# Patient Record
Sex: FEMALE | Race: BLACK OR AFRICAN AMERICAN | NOT HISPANIC OR LATINO | Employment: FULL TIME | URBAN - METROPOLITAN AREA
[De-identification: names, ages, dates, MRNs, and addresses within clinical notes are randomized per-mention and may not be internally consistent; named-entity substitution may affect disease eponyms.]

---

## 2017-05-10 ENCOUNTER — HOSPITAL ENCOUNTER (EMERGENCY)
Facility: HOSPITAL | Age: 42
Discharge: HOME | End: 2017-05-10
Attending: EMERGENCY MEDICINE
Payer: MEDICAID

## 2017-05-10 VITALS
SYSTOLIC BLOOD PRESSURE: 139 MMHG | HEIGHT: 66 IN | RESPIRATION RATE: 17 BRPM | DIASTOLIC BLOOD PRESSURE: 74 MMHG | WEIGHT: 155 LBS | HEART RATE: 61 BPM | BODY MASS INDEX: 24.91 KG/M2 | OXYGEN SATURATION: 99 % | TEMPERATURE: 97 F

## 2017-05-10 DIAGNOSIS — R51.9 SINUS HEADACHE: Primary | ICD-10-CM

## 2017-05-10 LAB
B-HCG UR QL: NEGATIVE
CTP QC/QA: YES

## 2017-05-10 PROCEDURE — 99284 EMERGENCY DEPT VISIT MOD MDM: CPT | Mod: 25

## 2017-05-10 PROCEDURE — 63600175 PHARM REV CODE 636 W HCPCS: Performed by: NURSE PRACTITIONER

## 2017-05-10 PROCEDURE — 25000003 PHARM REV CODE 250: Performed by: NURSE PRACTITIONER

## 2017-05-10 PROCEDURE — 96372 THER/PROPH/DIAG INJ SC/IM: CPT

## 2017-05-10 PROCEDURE — 81025 URINE PREGNANCY TEST: CPT | Performed by: NURSE PRACTITIONER

## 2017-05-10 RX ORDER — LORATADINE 10 MG/1
10 TABLET ORAL DAILY
Qty: 30 TABLET | Refills: 0 | Status: SHIPPED | OUTPATIENT
Start: 2017-05-10 | End: 2018-10-12

## 2017-05-10 RX ORDER — BUTALBITAL, ACETAMINOPHEN AND CAFFEINE 50; 325; 40 MG/1; MG/1; MG/1
1 TABLET ORAL EVERY 4 HOURS PRN
Qty: 20 TABLET | Refills: 0 | Status: SHIPPED | OUTPATIENT
Start: 2017-05-10 | End: 2017-06-09

## 2017-05-10 RX ORDER — NAPROXEN 500 MG/1
500 TABLET ORAL 2 TIMES DAILY PRN
Qty: 30 TABLET | Refills: 0 | Status: SHIPPED | OUTPATIENT
Start: 2017-05-10 | End: 2018-09-05

## 2017-05-10 RX ORDER — NAPROXEN SODIUM 220 MG
220 TABLET ORAL
COMMUNITY
End: 2018-09-05

## 2017-05-10 RX ORDER — KETOROLAC TROMETHAMINE 30 MG/ML
30 INJECTION, SOLUTION INTRAMUSCULAR; INTRAVENOUS
Status: COMPLETED | OUTPATIENT
Start: 2017-05-10 | End: 2017-05-10

## 2017-05-10 RX ORDER — ACETAMINOPHEN 500 MG
500 TABLET ORAL EVERY 6 HOURS PRN
COMMUNITY
End: 2017-05-10 | Stop reason: ALTCHOICE

## 2017-05-10 RX ORDER — FLUTICASONE PROPIONATE 50 MCG
1 SPRAY, SUSPENSION (ML) NASAL 2 TIMES DAILY PRN
Qty: 1 BOTTLE | Refills: 0 | Status: SHIPPED | OUTPATIENT
Start: 2017-05-10 | End: 2018-09-05

## 2017-05-10 RX ORDER — OXYMETAZOLINE HCL 0.05 %
2 SPRAY, NON-AEROSOL (ML) NASAL
Status: COMPLETED | OUTPATIENT
Start: 2017-05-10 | End: 2017-05-10

## 2017-05-10 RX ADMIN — OXYMETAZOLINE HYDROCHLORIDE 2 SPRAY: 5 SPRAY NASAL at 05:05

## 2017-05-10 RX ADMIN — KETOROLAC TROMETHAMINE 30 MG: 30 INJECTION, SOLUTION INTRAMUSCULAR at 07:05

## 2017-05-10 NOTE — ED PROVIDER NOTES
Encounter Date: 5/10/2017    SCRIBE #1 NOTE: I, Tracy Bell, am scribing for, and in the presence of,  Raj Ch NP. I have scribed the following portions of the note - Other sections scribed: ROS, HPI.       History     Chief Complaint   Patient presents with    Migraine     Pt. c/o migraine headaches intermittent since yesterday accompanied by pressure behind right eye. Pt. reports taking Excedrin without any relief.      Review of patient's allergies indicates:  No Known Allergies  HPI Comments: CC: Headache    HPI: Patient is a 41 y.o. F with a past medical history of Allergy; Headache; Sickle cell trait; and Thyroid disease who presents to the ED for evaluation of an acute R-sided headache and pain behind her eyes x1 week with associated photophobia, blurred vision due to pain, dizziness, and L sided neck pain. She adds that she became congested and started coughing and sneezing around the same time that her headaches began. Pain is severe. She attempted treatment with Excedrin with no relief. Patient denies sore throat, fever, and/or difficulty walking. She has never been formally diagnosed with migraines. No prior similar episodes reported.      The history is provided by the patient. No  was used.     Past Medical History:   Diagnosis Date    Allergy     seasonal    Headache     Sickle cell trait     Thyroid disease      Past Surgical History:   Procedure Laterality Date    COLONOSCOPY      TUBAL LIGATION       Family History   Problem Relation Age of Onset    Hypertension Mother     Hyperlipidemia Mother     Heart disease Father     Kidney disease Father     Heart disease Sister     Breast cancer Maternal Aunt     Hypertension Maternal Aunt     Clotting disorder Maternal Aunt     Anxiety disorder Maternal Aunt     Heart attack Maternal Aunt     Diabetes Sister     Hyperlipidemia Sister     Hypertension Sister      Social History   Substance Use Topics    Smoking  status: Current Every Day Smoker    Smokeless tobacco: None    Alcohol use No     Review of Systems   Constitutional: Negative for fever.   HENT: Positive for congestion, postnasal drip, rhinorrhea, sinus pressure and sneezing.    Eyes: Positive for photophobia and visual disturbance (blurred vision).   Respiratory: Positive for cough.    Cardiovascular: Negative for chest pain.   Gastrointestinal: Negative for abdominal pain, diarrhea, nausea and vomiting.   Genitourinary: Negative for dysuria.   Musculoskeletal: Positive for neck pain (L sided).   Skin: Negative for rash.   Neurological: Positive for dizziness and headaches (R sided with pain behind bilateral ey es).       Physical Exam   Initial Vitals   BP Pulse Resp Temp SpO2   05/10/17 1510 05/10/17 1510 05/10/17 1510 05/10/17 1510 05/10/17 1510   143/77 76 17 98.8 °F (37.1 °C) 99 %     Physical Exam    Nursing note and vitals reviewed.  Constitutional: She appears well-developed and well-nourished. She is not diaphoretic. No distress.   HENT:   Head: Normocephalic and atraumatic.   Right Ear: External ear normal.   Left Ear: External ear normal.   Nose: Nose normal.   Eyes: Conjunctivae and EOM are normal. Pupils are equal, round, and reactive to light. Right eye exhibits no discharge. Left eye exhibits no discharge. No scleral icterus.   Neck: Normal range of motion. Neck supple. No thyromegaly present. No tracheal deviation present. No JVD present.   Cardiovascular: Normal rate, regular rhythm, S1 normal, S2 normal, normal heart sounds and intact distal pulses. Exam reveals no gallop and no friction rub.    No murmur heard.  Palpable right temporal artery is without tenderness or crepitus   Pulmonary/Chest: Breath sounds normal. No stridor. No respiratory distress. She has no wheezes. She has no rhonchi. She has no rales.   Abdominal: Soft. Bowel sounds are normal. She exhibits no distension and no mass. There is no tenderness. There is no rebound and no  guarding.   Musculoskeletal: Normal range of motion. She exhibits no edema or tenderness.   Lymphadenopathy:     She has no cervical adenopathy.   Neurological: She is alert and oriented to person, place, and time. She has normal strength and normal reflexes. She displays normal reflexes. No cranial nerve deficit or sensory deficit.   Skin: Skin is warm and dry. No rash and no abscess noted. No erythema. No pallor.   Psychiatric: She has a normal mood and affect. Her behavior is normal. Judgment and thought content normal.         ED Course   Procedures  Labs Reviewed   POCT URINE PREGNANCY             Medical Decision Making:   Clinical Tests:   Radiological Study: Ordered and Reviewed  ED Management:  This is a 41 year old female presenting with right-sided temporal and frontal headache that began 2 weeks ago and has been intermittent. Pt describes headache as a pressure behind her right eye and in her right temporal area. Pt also presents with sinus congestion and rhinorrhea. Pt denies blurred vision and dizziness at this time. On exam there are no focal neuro deficits. PERRLA bilaterally. No meningismus. CT head shows no evidence of any acute intracranial process. Based on these findings I suspect sinus pressure and headache. I considered but doubt temporal arteritis, intracranial hemorrhage, tension headache, meningitis. Administered Afrin nasal spray in the ED. Prescribed Flonase, loratadine, Naproxen, and Fioricet at discharge. Pt discharged home with instructions for follow up and supportive care. ED return precautions given. Pt verbalized understanding of all information and instructions given. This case was discussed with Eyal Cerrato MD who agreed with the assessment and plan.      Other:   I have discussed this case with another health care provider.            Scribe Attestation:   Scribe #1: I performed the above scribed service and the documentation accurately describes the services I performed. I  attest to the accuracy of the note.    Attending Attestation:           Physician Attestation for Scribe:  Physician Attestation Statement for Scribe #1: I, Raj Ch NP, reviewed documentation, as scribed by Tracy Bell in my presence, and it is both accurate and complete.                 ED Course     Clinical Impression:   The encounter diagnosis was Sinus headache.    Disposition:   Disposition: Discharged  Condition: Stable       Raj Ch NP  05/10/17 1956

## 2017-05-10 NOTE — ED AVS SNAPSHOT
[facility]  [place] [address]               Melany Hackett   5/10/2017  5:05 PM   ED    Description:  Female : 1975   Department:  [facility]           Your Care was Coordinated By:     Provider Role From To    Eyal Cerrato MD Attending Provider 05/10/17 1706 --    Raj Ch NP Nurse Practitioner 05/10/17 1706 --      Reason for Visit     Migraine           Diagnoses this Visit        Comments    Sinus headache    -  Primary       ED Disposition     None           To Do List           Follow-up Information     Follow up with [facility] MD. Schedule an appointment as soon as possible for a visit in 3 days.    Specialty:  Family Medicine    Why:  For further evaluation    Contact information:    [place] [facility]  [phone]          Go to [facility].    Specialty:  Emergency Medicine    Why:  If symptoms worsen, As needed    Contact information:    [place] [id number] [phone]       These Medications        Disp Refills Start End    loratadine (CLARITIN) 10 mg tablet 30 tablet 0 5/10/2017 5/10/2018    Take 1 tablet (10 mg total) by mouth once daily. - Oral    Pharmacy: [id number] - [place], [place] - [address] Ph #: [phone]       fluticasone (FLONASE) 50 mcg/actuation nasal spray 1 Bottle 0 5/10/2017     1 spray by Each Nare route 2 (two) times daily as needed for Rhinitis or Allergies. - Each Nare    Pharmacy: [facility] [address] [place], [place] [address] [address] Ph #: [phone]       naproxen (NAPROSYN) 500 MG tablet 30 tablet 0 5/10/2017     Take 1 tablet (500 mg total) by mouth 2 (two) times daily as needed (for pain). - Oral    Pharmacy: [id number] - [place], [place] - [address] Ph #: [phone]       butalbital-acetaminophen-caffeine -40 mg (FIORICET, ESGIC) -40 mg per tablet 20 tablet 0  5/10/2017 2017    Take 1 tablet by mouth every 4 (four) hours as needed for Pain. - Oral    [facility] [address] [place] [address] [address]  #: [phone]         [facility]     [facility] Nurse Care Line -  Assistance  Unless otherwise directed by your provider, please contact [facility], our nurse care line that is available for  assistance.     Registered nurses in the [facility] provide: appointment scheduling, clinical advisement, health education, and other advisory services.  Call: [phone] (toll free)               Medications           START taking these NEW medications        Refills    loratadine (CLARITIN) 10 mg tablet 0    Sig: Take 1 tablet (10 mg total) by mouth once daily.    Class: Print    Route: Oral    fluticasone (FLONASE) 50 mcg/actuation nasal spray 0    Si spray by Each Nare route 2 (two) times daily as needed for Rhinitis or Allergies.    Class: Print    Route: Each Nare    naproxen (NAPROSYN) 500 MG tablet 0    Sig: Take 1 tablet (500 mg total) by mouth 2 (two) times daily as needed (for pain).    Class: Print    Route: Oral    butalbital-acetaminophen-caffeine -40 mg (FIORICET, ESGIC) -40 mg per tablet 0    Sig: Take 1 tablet by mouth every 4 (four) hours as needed for Pain.    Class: Print    Route: Oral      These medications were administered today        Dose Freq    oxymetazoline 0.05 % nasal spray 2 spray 2 spray ED 1 Time    Si sprays by Each Nare route ED 1 Time.    Class: Normal    Route: Each Nare    ketorolac injection 30 mg 30 mg ED 1 Time    Sig: Inject 30 mg into the muscle ED 1 Time.    Class: Normal    Route: Intramuscular      STOP taking these medications     meloxicam (MOBIC) 7.5 MG tablet     aspirin-acetaminophen-caffeine 250-250-65 mg (EXCEDRIN MIGRAINE) 250-250-65 mg per tablet Take 1 tablet by mouth every 6 (six) hours as needed for Pain.    acetaminophen (TYLENOL) 500 MG tablet  "Take 500 mg by mouth every 6 (six) hours as needed for Pain.           Verify that the below list of medications is an accurate representation of the medications you are currently taking.  If none reported, the list may be blank. If incorrect, please contact your healthcare provider. Carry this list with you in case of emergency.           Current Medications     naproxen sodium (ANAPROX) 220 MG tablet Take 220 mg by mouth every 12 (twelve) hours.    butalbital-acetaminophen-caffeine -40 mg (FIORICET, ESGIC) -40 mg per tablet Take 1 tablet by mouth every 4 (four) hours as needed for Pain.    ergocalciferol (ERGOCALCIFEROL) 50,000 unit Cap Take 1 capsule (50,000 Units total) by mouth every 7 days.    fluticasone (FLONASE) 50 mcg/actuation nasal spray 1 spray by Each Nare route 2 (two) times daily as needed for Rhinitis or Allergies.    ketorolac injection 30 mg Inject 30 mg into the muscle ED 1 Time.    loratadine (CLARITIN) 10 mg tablet Take 1 tablet (10 mg total) by mouth once daily.    naproxen (NAPROSYN) 500 MG tablet Take 1 tablet (500 mg total) by mouth 2 (two) times daily as needed (for pain).           Clinical Reference Information           Your Vitals Were     BP Pulse Temp Resp Height Weight    139/74 (BP Location: Left arm, Patient Position: Sitting, BP Method: Automatic) 61 97.2 °F (36.2 °C) (Oral) 17 5' 6" (1.676 m) 70.3 kg (155 lb)    SpO2 BMI             99% 25.02 kg/m2         Allergies as of 5/10/2017     No Known Allergies      Immunizations Administered on Date of Encounter - 5/10/2017     None      ED Micro, Lab, POCT     Start Ordered       Status Ordering Provider    05/10/17 1725 05/10/17 1724  POCT urine pregnancy  Once      Final result       ED Imaging Orders     Start Ordered       Status Ordering Provider    05/10/17 1724 05/10/17 1724  CT Head Without Contrast  1 time imaging      Final result         Discharge Instructions       Follow up with your primary care physician " for further evaluation.    Take all medications as prescribed.    Return to the emergency department for any new or worsening symptoms.    Discharge References/Attachments     HEADACHES, SINUS (ENGLISH)    SINUS HEADACHE (ENGLISH)      Smoking Cessation     If you would like to quit smoking:   You may be eligible for free services if you are a [place] resident and started smoking cigarettes before September 1, 1988.  Call the [employer]oking [employer]essation [employer] ([employer]) toll free at (149) 705-0386 or (022) 803-4013.   Call 1-800-QUIT-NOW if you do not meet the above criteria.   Contact us via email: [email or web address]   View our website for more information: [email or web address]         [facility] complies with applicable Federal civil rights laws and does not discriminate on the basis of race, color, national origin, age, disability, or sex.        Language Assistance Services     ATTENTION: Language assistance services are available, free of charge. Please call [phone].      ATENCIÓN: Si habla español, tiene a floyd disposición servicios gratuitos de asistencia lingüística. Llame al [phone].     CHÚ Ý: N?u b?n nói Ti?ng Vi?t, có các d?ch v? h? tr? ngôn ng? mi?n phí baileeh cho b?n. G?i s? [phone].

## 2017-05-10 NOTE — ED TRIAGE NOTES
Headache for about a week now by right eye has had frontal headache blurred vision and upset stomach

## 2017-05-11 NOTE — DISCHARGE INSTRUCTIONS
Follow up with your primary care physician for further evaluation.    Take all medications as prescribed.    Return to the emergency department for any new or worsening symptoms.

## 2018-05-08 ENCOUNTER — HOSPITAL ENCOUNTER (EMERGENCY)
Facility: HOSPITAL | Age: 43
Discharge: HOME | End: 2018-05-09
Attending: EMERGENCY MEDICINE
Payer: MEDICAID

## 2018-05-08 DIAGNOSIS — T25.222A PARTIAL THICKNESS BURN OF LEFT FOOT, INITIAL ENCOUNTER: Primary | ICD-10-CM

## 2018-05-08 PROCEDURE — 99283 EMERGENCY DEPT VISIT LOW MDM: CPT | Mod: 25

## 2018-05-08 PROCEDURE — 16020 DRESS/DEBRID P-THICK BURN S: CPT

## 2018-05-08 PROCEDURE — 81025 URINE PREGNANCY TEST: CPT | Performed by: EMERGENCY MEDICINE

## 2018-05-09 VITALS
SYSTOLIC BLOOD PRESSURE: 118 MMHG | WEIGHT: 147 LBS | RESPIRATION RATE: 18 BRPM | OXYGEN SATURATION: 99 % | BODY MASS INDEX: 24.49 KG/M2 | TEMPERATURE: 98 F | HEIGHT: 65 IN | HEART RATE: 63 BPM | DIASTOLIC BLOOD PRESSURE: 71 MMHG

## 2018-05-09 LAB
B-HCG UR QL: NEGATIVE
CTP QC/QA: YES

## 2018-05-09 PROCEDURE — 25000003 PHARM REV CODE 250: Performed by: NURSE PRACTITIONER

## 2018-05-09 PROCEDURE — 90715 TDAP VACCINE 7 YRS/> IM: CPT | Performed by: NURSE PRACTITIONER

## 2018-05-09 PROCEDURE — 90471 IMMUNIZATION ADMIN: CPT | Performed by: NURSE PRACTITIONER

## 2018-05-09 PROCEDURE — 63600175 PHARM REV CODE 636 W HCPCS: Performed by: NURSE PRACTITIONER

## 2018-05-09 RX ORDER — HYDROCODONE BITARTRATE AND ACETAMINOPHEN 5; 325 MG/1; MG/1
1 TABLET ORAL EVERY 4 HOURS PRN
Qty: 5 TABLET | Refills: 0 | Status: SHIPPED | OUTPATIENT
Start: 2018-05-09 | End: 2018-09-05

## 2018-05-09 RX ORDER — SILVER SULFADIAZINE 10 G/1000G
1 CREAM TOPICAL
Status: COMPLETED | OUTPATIENT
Start: 2018-05-09 | End: 2018-05-09

## 2018-05-09 RX ORDER — HYDROCODONE BITARTRATE AND ACETAMINOPHEN 5; 325 MG/1; MG/1
1 TABLET ORAL
Status: COMPLETED | OUTPATIENT
Start: 2018-05-09 | End: 2018-05-09

## 2018-05-09 RX ORDER — SILVER SULFADIAZINE 10 G/1000G
CREAM TOPICAL 2 TIMES DAILY
Qty: 30 G | Refills: 0 | Status: SHIPPED | OUTPATIENT
Start: 2018-05-09 | End: 2018-09-05

## 2018-05-09 RX ORDER — IBUPROFEN 400 MG/1
400 TABLET ORAL EVERY 6 HOURS PRN
Qty: 20 TABLET | Refills: 0 | Status: SHIPPED | OUTPATIENT
Start: 2018-05-09 | End: 2018-09-05

## 2018-05-09 RX ADMIN — CLOSTRIDIUM TETANI TOXOID ANTIGEN (FORMALDEHYDE INACTIVATED), CORYNEBACTERIUM DIPHTHERIAE TOXOID ANTIGEN (FORMALDEHYDE INACTIVATED), BORDETELLA PERTUSSIS TOXOID ANTIGEN (GLUTARALDEHYDE INACTIVATED), BORDETELLA PERTUSSIS FILAMENTOUS HEMAGGLUTININ ANTIGEN (FORMALDEHYDE INACTIVATED), BORDETELLA PERTUSSIS PERTACTIN ANTIGEN, AND BORDETELLA PERTUSSIS FIMBRIAE 2/3 ANTIGEN 0.5 ML: 5; 2; 2.5; 5; 3; 5 INJECTION, SUSPENSION INTRAMUSCULAR at 12:05

## 2018-05-09 RX ADMIN — SILVER SULFADIAZINE 1 TUBE: 10 CREAM TOPICAL at 12:05

## 2018-05-09 RX ADMIN — HYDROCODONE BITARTRATE AND ACETAMINOPHEN 1 TABLET: 5; 325 TABLET ORAL at 12:05

## 2018-05-09 NOTE — ED PROVIDER NOTES
Encounter Date: 5/8/2018    This is a 42 y.o. female complaining of real to left dorsal foot. Pt spilled boiling water just PTA. Treated at home with Slivadene. Pertinent exam findings remarkable for erythema and tenderness to left dorsal foot. Orders entered if indicated.    Raj Ch NP    SCRIBE #1 NOTE: Jose Carlos COOK am scribing for, and in the presence of,  Princess Duran NP. I have scribed the following portions of the note - Other sections scribed: HPI, ROS.       History     Chief Complaint   Patient presents with    Burn     Pt reports spilling boiling water on L foot.  Briggs noted to top of L foot.      CC: Burn    42 year old female  has a past medical history of Allergy; Headache; Hyperlipemia; Sickle cell trait; and Thyroid disease presents to the ED for evaluation of a burn to the dorsal aspect of her left foot. Pt reports spilling boiling water on left foot. She removed her sock and applied SSD cream. Pt reports the pain has been constant since the incident. Pt's last tetanus shot was in 2012. She denies fever, chills, difficulty walking, and other associated symptoms. No prior attempts at treatment reported.       The history is provided by the patient. No  was used.     Review of patient's allergies indicates:  No Known Allergies  Past Medical History:   Diagnosis Date    Allergy     seasonal    Headache     Hyperlipemia     Sickle cell trait     Thyroid disease      Past Surgical History:   Procedure Laterality Date    COLONOSCOPY      TUBAL LIGATION       Family History   Problem Relation Age of Onset    Hypertension Mother     Hyperlipidemia Mother     Heart disease Father     Kidney disease Father     Heart disease Sister     Breast cancer Maternal Aunt     Hypertension Maternal Aunt     Clotting disorder Maternal Aunt     Anxiety disorder Maternal Aunt     Heart attack Maternal Aunt     Diabetes Sister     Hyperlipidemia Sister      Hypertension Sister      Social History   Substance Use Topics    Smoking status: Current Every Day Smoker    Smokeless tobacco: Current User    Alcohol use Yes     Review of Systems   Constitutional: Negative for fever.   HENT: Negative for sore throat.    Respiratory: Negative for shortness of breath.    Cardiovascular: Negative for chest pain.   Gastrointestinal: Negative for nausea.   Genitourinary: Negative for dysuria.   Musculoskeletal: Negative for back pain.   Skin: Positive for wound (to top of L foot). Negative for rash.   Neurological: Negative for weakness.   Hematological: Does not bruise/bleed easily.       Physical Exam     Initial Vitals [05/08/18 2131]   BP Pulse Resp Temp SpO2   126/87 93 18 98.8 °F (37.1 °C) 97 %      MAP       100         Physical Exam    Constitutional: She appears well-developed and well-nourished. She is not diaphoretic. No distress.   HENT:   Head: Normocephalic and atraumatic.   Neck: Normal range of motion.   Cardiovascular: Normal rate, regular rhythm and normal heart sounds. Exam reveals no gallop and no friction rub.    No murmur heard.  Pulmonary/Chest: Breath sounds normal. No respiratory distress.   Musculoskeletal: Normal range of motion.        Left ankle: Normal.        Left foot: There is tenderness and deformity (4cm in diameter circular area of erythema noted to the top of the left foot with blistering.  No drainage or discharge noted. ). There is normal range of motion and normal capillary refill.   Neurological: She is alert and oriented to person, place, and time.   Skin: Skin is warm and dry.   Psychiatric: She has a normal mood and affect. Her behavior is normal.         ED Course   Procedures  Labs Reviewed   POCT URINE PREGNANCY             Medical Decision Making:   ED Management:  This is an evaluation of a 42 y.o. female that presents to the Emergency Department for burn to top of left foot.  Physical Exam shows a non-toxic, afebrile, and well  appearing female. There is a 4 cm x 4 cm area of erythema with blistering to the dorsal aspect of the left foot. No surrounding induration and no surrounding erythema.  There is not active drainage. The patient has full range of motion of the ankle and toes.  Foot and ankle compartments are soft.  Good capillary refill.  Good dorsalis pedis pulse.  Patient is able to ambulate without any difficulty.    Vital Signs Are Reassuring.   UPT negative.    My overall impression is superficial partial-thickness burn. I considered, but at this time, do not suspect compartment syndrome, cellulitis, or neurovascular compromise.    ED Course:  Tdap, Norco, Silvadene. D/C Meds:  Norco, ibuprofen, Silvadene. Additional D/C Information:  Burn care and wound care.  The diagnosis, treatment plan, instructions for follow-up and reevaluation with her PCP or the ED in 2 - 3 days for wound recheck as well as ED return precautions were discussed and understanding was verbalized. All questions or concerns have been addressed.     This case was discussed with Dr. Stovall who is in agreement with my assessment and plan.             Scribe Attestation:   Scribe #1: I performed the above scribed service and the documentation accurately describes the services I performed. I attest to the accuracy of the note.    Attending Attestation:           Physician Attestation for Scribe:  Physician Attestation Statement for Scribe #1: I, Princess Duran NP, reviewed documentation, as scribed by Jose Carlos Mo in my presence, and it is both accurate and complete.                    Clinical Impression:   The encounter diagnosis was Partial thickness burn of left foot, initial encounter.    Disposition:   Disposition: Discharged  Condition: Stable                        Princess Duran NP  05/09/18 0535

## 2018-05-09 NOTE — ED TRIAGE NOTES
Patient presents to the ER with boyfriend via personal vehicle. Patient reports that she spilled some boiling water on her left foot. Reports she had to peel off her sock. 9/10 pain

## 2018-05-09 NOTE — DISCHARGE INSTRUCTIONS
You have been prescribed NORCO for pain. Please do not take this medication while working, drinking alcohol, swimming, or while driving/operating heavy machinery. This medication may cause drowsiness, impair judgment, and reduce physical capabilities.    You have been prescribed ibuprofen for pain. This is an Non-Steroidal Anti-Inflammatory (NSAID) Medication. Please do not take any additional NSAIDs while you are taking this medication including (Advil, Aleve, Motrin, Ibuprofen, Mobic\meloxicam, Naprosyn, etc.). Please stop taking this medication if you experience: weakness, itching, yellow skin or eyes, joint pains, vomiting blood, blood or black stools, unusual weight gain, or swelling in your arms, legs, hands, or feet.       Please keep your wound clean and dry.  Wash gently with soap and water and apply burn ointment over the wound after washing. Please watch for signs of infection including: increased\spreading redness, swelling, pus-like discharge, or a fever greater than 100.4F. If you experience any of these, please contact your Primary Care Doctor or Return to the Emergency Department for a wound check.     Please follow up with your Primary Care Doctor in 2 days for wound recheck.  You may return to the Emergency Department if you are unable to see your Primary Care Doctor.  Please return to the ER for any new or worsening symptoms.

## 2018-09-05 PROBLEM — K80.20 GALLSTONES: Status: ACTIVE | Noted: 2018-09-05

## 2018-09-27 ENCOUNTER — TELEPHONE (OUTPATIENT)
Dept: SURGERY | Facility: CLINIC | Age: 43
End: 2018-09-27

## 2018-10-12 ENCOUNTER — HOSPITAL ENCOUNTER (OUTPATIENT)
Dept: PREADMISSION TESTING | Facility: HOSPITAL | Age: 43
Discharge: HOME | End: 2018-10-12
Attending: SURGERY
Payer: COMMERCIAL

## 2018-10-12 VITALS
HEART RATE: 85 BPM | OXYGEN SATURATION: 99 % | HEIGHT: 66 IN | SYSTOLIC BLOOD PRESSURE: 114 MMHG | TEMPERATURE: 98 F | DIASTOLIC BLOOD PRESSURE: 70 MMHG | BODY MASS INDEX: 25.55 KG/M2 | RESPIRATION RATE: 18 BRPM | WEIGHT: 159 LBS

## 2018-10-12 DIAGNOSIS — K80.20 GALLSTONES: ICD-10-CM

## 2018-10-12 LAB
ALBUMIN SERPL BCP-MCNC: 2.9 G/DL
ALP SERPL-CCNC: 814 U/L
ALT SERPL W/O P-5'-P-CCNC: 55 U/L
ANION GAP SERPL CALC-SCNC: 9 MMOL/L
AST SERPL-CCNC: 50 U/L
BASOPHILS # BLD AUTO: 0.06 K/UL
BASOPHILS NFR BLD: 1.1 %
BILIRUB SERPL-MCNC: 0.9 MG/DL
BUN SERPL-MCNC: 13 MG/DL
CALCIUM SERPL-MCNC: 9.8 MG/DL
CHLORIDE SERPL-SCNC: 104 MMOL/L
CO2 SERPL-SCNC: 24 MMOL/L
CREAT SERPL-MCNC: 1 MG/DL
DIFFERENTIAL METHOD: ABNORMAL
EOSINOPHIL # BLD AUTO: 0.2 K/UL
EOSINOPHIL NFR BLD: 4.3 %
ERYTHROCYTE [DISTWIDTH] IN BLOOD BY AUTOMATED COUNT: 16.9 %
EST. GFR  (AFRICAN AMERICAN): >60 ML/MIN/1.73 M^2
EST. GFR  (NON AFRICAN AMERICAN): >60 ML/MIN/1.73 M^2
GLUCOSE SERPL-MCNC: 86 MG/DL
HCT VFR BLD AUTO: 38 %
HGB BLD-MCNC: 12.5 G/DL
LYMPHOCYTES # BLD AUTO: 1.4 K/UL
LYMPHOCYTES NFR BLD: 26.4 %
MCH RBC QN AUTO: 24.3 PG
MCHC RBC AUTO-ENTMCNC: 32.9 G/DL
MCV RBC AUTO: 74 FL
MONOCYTES # BLD AUTO: 0.9 K/UL
MONOCYTES NFR BLD: 16.2 %
NEUTROPHILS # BLD AUTO: 2.8 K/UL
NEUTROPHILS NFR BLD: 51.8 %
PLATELET # BLD AUTO: 384 K/UL
PMV BLD AUTO: 9.6 FL
POTASSIUM SERPL-SCNC: 4.1 MMOL/L
PROT SERPL-MCNC: 8.9 G/DL
RBC # BLD AUTO: 5.14 M/UL
SODIUM SERPL-SCNC: 137 MMOL/L
WBC # BLD AUTO: 5.37 K/UL

## 2018-10-12 PROCEDURE — 36415 COLL VENOUS BLD VENIPUNCTURE: CPT

## 2018-10-12 PROCEDURE — 80053 COMPREHEN METABOLIC PANEL: CPT

## 2018-10-12 PROCEDURE — 85025 COMPLETE CBC W/AUTO DIFF WBC: CPT

## 2018-10-12 NOTE — DISCHARGE INSTRUCTIONS
Your surgery is scheduled for____10/19/2018_____________.    Call [phone] between 2 pm and 5 pm ____10/18/2018_______ to find out your arrival time for the day of surgery.    Report to SAME DAY SURGERY UNIT at _______am on the 2nd floor of the hospital.  Use the front entrance of the hospital.  The front doors of the hospital open promptly at 5:30 am.    If you need wheelchair assistance, call [phone] from your cell phone,  or call 0 from the courtesy phone in the hospital lobby.    Important instructions:   Do not eat or drink after 12 midnight, including water.  It is okay to brush your teeth.  Do not have gum, candy or mints.      Stop taking Aspirin, Ibuprofen, Motrin and Aleve , Fish oil, and Vitamin E for at least 7 days before your surgery. You may use Tylenol unless otherwise instructed by your doctor.         Please shower the night before and the morning of your surgery.        Use Hibiclens soap to your surgery site if instructed by your pre op nurse.   If your surgery is on your abdomen, be sure to wash your naval.  Be sure to rinse off Hibiclens after it is on your skin for several minutes.  Do not use Hibiclens on your face or genitals. Please place clean linens on your bed the night before surgery. Please wear fresh clean clothing after each shower.     No shaving of procedural area at least 4-5 days before surgery due to increased risk of skin irritation and/or possible infection.     Do not wear make- up, including mascara.     You may wear deodorant only.      Do not wear powder, body lotion or cologne.     Do not wear any jewelry or have any metal on your body.     Please bring any documents given to you by your doctor.     If you are going home on the same day of surgery, you must arrange for a family member or a friend to drive you home.  Public transportation is prohibited.    You will not be able to drive home if you were given anesthesia or sedation.     Wear loose fitting  clothes allowing for bandages.     Please leave money and valuables home.       You may bring your cell phone.     Call the doctor if fever or illness should occur before your surgery.    Call [phone] to contact us here at [facility] if needed.

## 2018-10-19 ENCOUNTER — HOSPITAL ENCOUNTER (OUTPATIENT)
Facility: HOSPITAL | Age: 43
Discharge: HOME | End: 2018-10-19
Attending: SURGERY | Admitting: SURGERY
Payer: COMMERCIAL

## 2018-10-19 ENCOUNTER — ANESTHESIA EVENT (OUTPATIENT)
Dept: SURGERY | Facility: HOSPITAL | Age: 43
End: 2018-10-19
Payer: COMMERCIAL

## 2018-10-19 ENCOUNTER — ANESTHESIA (OUTPATIENT)
Dept: SURGERY | Facility: HOSPITAL | Age: 43
End: 2018-10-19
Payer: COMMERCIAL

## 2018-10-19 VITALS
RESPIRATION RATE: 16 BRPM | SYSTOLIC BLOOD PRESSURE: 115 MMHG | HEART RATE: 74 BPM | HEIGHT: 66 IN | BODY MASS INDEX: 25.55 KG/M2 | DIASTOLIC BLOOD PRESSURE: 60 MMHG | OXYGEN SATURATION: 99 % | WEIGHT: 159 LBS | TEMPERATURE: 98 F

## 2018-10-19 DIAGNOSIS — K80.20 CALCULUS OF GALLBLADDER WITHOUT CHOLECYSTITIS WITHOUT OBSTRUCTION: Primary | ICD-10-CM

## 2018-10-19 DIAGNOSIS — K80.20 GALLSTONES: ICD-10-CM

## 2018-10-19 PROCEDURE — 00790 ANES IPER UPR ABD NOS: CPT | Performed by: SURGERY

## 2018-10-19 PROCEDURE — D9220A PRA ANESTHESIA: Mod: ,,, | Performed by: ANESTHESIOLOGY

## 2018-10-19 PROCEDURE — 63600175 PHARM REV CODE 636 W HCPCS: Performed by: NURSE ANESTHETIST, CERTIFIED REGISTERED

## 2018-10-19 PROCEDURE — 63600175 PHARM REV CODE 636 W HCPCS: Performed by: ANESTHESIOLOGY

## 2018-10-19 PROCEDURE — 36000708 HC OR TIME LEV III 1ST 15 MIN: Performed by: SURGERY

## 2018-10-19 PROCEDURE — 36000709 HC OR TIME LEV III EA ADD 15 MIN: Performed by: SURGERY

## 2018-10-19 PROCEDURE — 25000003 PHARM REV CODE 250: Performed by: SURGERY

## 2018-10-19 PROCEDURE — 71000033 HC RECOVERY, INTIAL HOUR: Performed by: SURGERY

## 2018-10-19 PROCEDURE — 88312 SPECIAL STAINS GROUP 1: CPT | Mod: 26,,, | Performed by: PATHOLOGY

## 2018-10-19 PROCEDURE — 37000009 HC ANESTHESIA EA ADD 15 MINS: Performed by: SURGERY

## 2018-10-19 PROCEDURE — 27201423 OPTIME MED/SURG SUP & DEVICES STERILE SUPPLY: Performed by: SURGERY

## 2018-10-19 PROCEDURE — 88313 SPECIAL STAINS GROUP 2: CPT | Mod: 26,,, | Performed by: PATHOLOGY

## 2018-10-19 PROCEDURE — C9290 INJ, BUPIVACAINE LIPOSOME: HCPCS | Performed by: SURGERY

## 2018-10-19 PROCEDURE — 88304 TISSUE EXAM BY PATHOLOGIST: CPT | Mod: 26,,, | Performed by: PATHOLOGY

## 2018-10-19 PROCEDURE — 88312 SPECIAL STAINS GROUP 1: CPT | Performed by: PATHOLOGY

## 2018-10-19 PROCEDURE — 25000003 PHARM REV CODE 250: Performed by: NURSE ANESTHETIST, CERTIFIED REGISTERED

## 2018-10-19 PROCEDURE — 25000242 PHARM REV CODE 250 ALT 637 W/ HCPCS: Performed by: NURSE ANESTHETIST, CERTIFIED REGISTERED

## 2018-10-19 PROCEDURE — 71000015 HC POSTOP RECOV 1ST HR: Performed by: SURGERY

## 2018-10-19 PROCEDURE — 88331 PATH CONSLTJ SURG 1 BLK 1SPC: CPT | Mod: 26,,, | Performed by: PATHOLOGY

## 2018-10-19 PROCEDURE — 47562 LAPAROSCOPIC CHOLECYSTECTOMY: CPT | Mod: ,,, | Performed by: SURGERY

## 2018-10-19 PROCEDURE — 47379 UNLISTED LAPS PX LIVER: CPT | Mod: 51,,, | Performed by: SURGERY

## 2018-10-19 PROCEDURE — 37000008 HC ANESTHESIA 1ST 15 MINUTES: Performed by: SURGERY

## 2018-10-19 PROCEDURE — 88307 TISSUE EXAM BY PATHOLOGIST: CPT | Mod: 26,,, | Performed by: PATHOLOGY

## 2018-10-19 PROCEDURE — S0020 INJECTION, BUPIVICAINE HYDRO: HCPCS | Performed by: SURGERY

## 2018-10-19 PROCEDURE — 63600175 PHARM REV CODE 636 W HCPCS: Performed by: SURGERY

## 2018-10-19 PROCEDURE — 71000016 HC POSTOP RECOV ADDL HR: Performed by: SURGERY

## 2018-10-19 PROCEDURE — 88313 SPECIAL STAINS GROUP 2: CPT | Performed by: PATHOLOGY

## 2018-10-19 PROCEDURE — 71000039 HC RECOVERY, EACH ADD'L HOUR: Performed by: SURGERY

## 2018-10-19 RX ORDER — MORPHINE SULFATE 4 MG/ML
2 INJECTION, SOLUTION INTRAMUSCULAR; INTRAVENOUS EVERY 5 MIN PRN
Status: DISCONTINUED | OUTPATIENT
Start: 2018-10-19 | End: 2018-10-19 | Stop reason: HOSPADM

## 2018-10-19 RX ORDER — GLYCOPYRROLATE 0.2 MG/ML
INJECTION INTRAMUSCULAR; INTRAVENOUS
Status: DISCONTINUED | OUTPATIENT
Start: 2018-10-19 | End: 2018-10-19

## 2018-10-19 RX ORDER — BUPIVACAINE HYDROCHLORIDE 2.5 MG/ML
INJECTION, SOLUTION INFILTRATION; PERINEURAL
Status: DISCONTINUED | OUTPATIENT
Start: 2018-10-19 | End: 2018-10-19 | Stop reason: HOSPADM

## 2018-10-19 RX ORDER — MORPHINE SULFATE 4 MG/ML
3 INJECTION, SOLUTION INTRAMUSCULAR; INTRAVENOUS
Status: DISCONTINUED | OUTPATIENT
Start: 2018-10-19 | End: 2018-10-19 | Stop reason: HOSPADM

## 2018-10-19 RX ORDER — HYDROCODONE BITARTRATE AND ACETAMINOPHEN 5; 325 MG/1; MG/1
1 TABLET ORAL ONCE
Status: COMPLETED | OUTPATIENT
Start: 2018-10-19 | End: 2018-10-19

## 2018-10-19 RX ORDER — ROCURONIUM BROMIDE 10 MG/ML
INJECTION, SOLUTION INTRAVENOUS
Status: DISCONTINUED | OUTPATIENT
Start: 2018-10-19 | End: 2018-10-19

## 2018-10-19 RX ORDER — FENTANYL CITRATE 50 UG/ML
INJECTION, SOLUTION INTRAMUSCULAR; INTRAVENOUS
Status: DISCONTINUED | OUTPATIENT
Start: 2018-10-19 | End: 2018-10-19

## 2018-10-19 RX ORDER — ONDANSETRON 2 MG/ML
4 INJECTION INTRAMUSCULAR; INTRAVENOUS ONCE
Status: COMPLETED | OUTPATIENT
Start: 2018-10-19 | End: 2018-10-19

## 2018-10-19 RX ORDER — LIDOCAINE HCL/PF 100 MG/5ML
SYRINGE (ML) INTRAVENOUS
Status: DISCONTINUED | OUTPATIENT
Start: 2018-10-19 | End: 2018-10-19

## 2018-10-19 RX ORDER — CEFAZOLIN SODIUM 2 G/50ML
2 SOLUTION INTRAVENOUS
Status: COMPLETED | OUTPATIENT
Start: 2018-10-19 | End: 2018-10-19

## 2018-10-19 RX ORDER — PROPOFOL 10 MG/ML
VIAL (ML) INTRAVENOUS
Status: DISCONTINUED | OUTPATIENT
Start: 2018-10-19 | End: 2018-10-19

## 2018-10-19 RX ORDER — NEOSTIGMINE METHYLSULFATE 1 MG/ML
INJECTION, SOLUTION INTRAVENOUS
Status: DISCONTINUED | OUTPATIENT
Start: 2018-10-19 | End: 2018-10-19

## 2018-10-19 RX ORDER — SODIUM CHLORIDE 0.9 % (FLUSH) 0.9 %
3 SYRINGE (ML) INJECTION
Status: DISCONTINUED | OUTPATIENT
Start: 2018-10-19 | End: 2018-10-19 | Stop reason: HOSPADM

## 2018-10-19 RX ORDER — MIDAZOLAM HYDROCHLORIDE 1 MG/ML
INJECTION, SOLUTION INTRAMUSCULAR; INTRAVENOUS
Status: DISCONTINUED | OUTPATIENT
Start: 2018-10-19 | End: 2018-10-19

## 2018-10-19 RX ORDER — SODIUM CHLORIDE 9 MG/ML
INJECTION, SOLUTION INTRAVENOUS CONTINUOUS
Status: DISCONTINUED | OUTPATIENT
Start: 2018-10-19 | End: 2018-10-19 | Stop reason: HOSPADM

## 2018-10-19 RX ORDER — PHENYLEPHRINE HYDROCHLORIDE 10 MG/ML
INJECTION INTRAVENOUS
Status: DISCONTINUED | OUTPATIENT
Start: 2018-10-19 | End: 2018-10-19

## 2018-10-19 RX ORDER — HYDROCODONE BITARTRATE AND ACETAMINOPHEN 5; 325 MG/1; MG/1
1 TABLET ORAL EVERY 4 HOURS PRN
Qty: 30 TABLET | Refills: 0 | Status: SHIPPED | OUTPATIENT
Start: 2018-10-19 | End: 2018-12-12

## 2018-10-19 RX ORDER — ALBUTEROL SULFATE 90 UG/1
AEROSOL, METERED RESPIRATORY (INHALATION)
Status: DISCONTINUED | OUTPATIENT
Start: 2018-10-19 | End: 2018-10-19

## 2018-10-19 RX ORDER — METOCLOPRAMIDE HYDROCHLORIDE 5 MG/ML
INJECTION INTRAMUSCULAR; INTRAVENOUS
Status: DISCONTINUED | OUTPATIENT
Start: 2018-10-19 | End: 2018-10-19

## 2018-10-19 RX ORDER — ACETAMINOPHEN 10 MG/ML
1000 INJECTION, SOLUTION INTRAVENOUS ONCE
Status: DISCONTINUED | OUTPATIENT
Start: 2018-10-19 | End: 2018-10-19 | Stop reason: HOSPADM

## 2018-10-19 RX ORDER — DEXAMETHASONE SODIUM PHOSPHATE 4 MG/ML
INJECTION, SOLUTION INTRA-ARTICULAR; INTRALESIONAL; INTRAMUSCULAR; INTRAVENOUS; SOFT TISSUE
Status: DISCONTINUED | OUTPATIENT
Start: 2018-10-19 | End: 2018-10-19

## 2018-10-19 RX ORDER — ONDANSETRON 2 MG/ML
INJECTION INTRAMUSCULAR; INTRAVENOUS
Status: DISCONTINUED | OUTPATIENT
Start: 2018-10-19 | End: 2018-10-19

## 2018-10-19 RX ADMIN — MORPHINE SULFATE 2 MG: 4 INJECTION INTRAVENOUS at 09:10

## 2018-10-19 RX ADMIN — PHENYLEPHRINE HYDROCHLORIDE 100 MCG: 10 INJECTION INTRAVENOUS at 07:10

## 2018-10-19 RX ADMIN — NEOSTIGMINE METHYLSULFATE 5 MG: 1 INJECTION INTRAVENOUS at 08:10

## 2018-10-19 RX ADMIN — ROCURONIUM BROMIDE 30 MG: 10 INJECTION, SOLUTION INTRAVENOUS at 07:10

## 2018-10-19 RX ADMIN — ALBUTEROL SULFATE 4 PUFF: 90 AEROSOL, METERED RESPIRATORY (INHALATION) at 08:10

## 2018-10-19 RX ADMIN — SODIUM CHLORIDE: 0.9 INJECTION, SOLUTION INTRAVENOUS at 08:10

## 2018-10-19 RX ADMIN — FENTANYL CITRATE 50 MCG: 50 INJECTION INTRAMUSCULAR; INTRAVENOUS at 07:10

## 2018-10-19 RX ADMIN — SODIUM CHLORIDE: 0.9 INJECTION, SOLUTION INTRAVENOUS at 07:10

## 2018-10-19 RX ADMIN — DEXAMETHASONE SODIUM PHOSPHATE 4 MG: 4 INJECTION, SOLUTION INTRAMUSCULAR; INTRAVENOUS at 07:10

## 2018-10-19 RX ADMIN — ROCURONIUM BROMIDE 20 MG: 10 INJECTION, SOLUTION INTRAVENOUS at 08:10

## 2018-10-19 RX ADMIN — METOCLOPRAMIDE 10 MG: 5 INJECTION, SOLUTION INTRAMUSCULAR; INTRAVENOUS at 07:10

## 2018-10-19 RX ADMIN — ONDANSETRON HYDROCHLORIDE 4 MG: 2 INJECTION INTRAMUSCULAR; INTRAVENOUS at 02:10

## 2018-10-19 RX ADMIN — PROPOFOL 170 MG: 10 INJECTION, EMULSION INTRAVENOUS at 07:10

## 2018-10-19 RX ADMIN — HYDROCODONE BITARTRATE AND ACETAMINOPHEN 1 TABLET: 5; 325 TABLET ORAL at 12:10

## 2018-10-19 RX ADMIN — LIDOCAINE HYDROCHLORIDE 80 MG: 20 INJECTION, SOLUTION INTRAVENOUS at 07:10

## 2018-10-19 RX ADMIN — CEFAZOLIN SODIUM 2 G: 2 SOLUTION INTRAVENOUS at 07:10

## 2018-10-19 RX ADMIN — GLYCOPYRROLATE 0.8 MG: 0.2 INJECTION, SOLUTION INTRAMUSCULAR; INTRAVENOUS at 08:10

## 2018-10-19 RX ADMIN — PROPOFOL 80 MG: 10 INJECTION, EMULSION INTRAVENOUS at 08:10

## 2018-10-19 RX ADMIN — ONDANSETRON 4 MG: 2 INJECTION, SOLUTION INTRAMUSCULAR; INTRAVENOUS at 07:10

## 2018-10-19 RX ADMIN — MIDAZOLAM HYDROCHLORIDE 2 MG: 1 INJECTION, SOLUTION INTRAMUSCULAR; INTRAVENOUS at 07:10

## 2018-10-19 NOTE — DISCHARGE INSTRUCTIONS
[facility]   Office # [phone]     Discharge Instructions for Same Day Surgery     Call the office for and appointment if one has not already been made.     Diet: Drink plenty of fluids the first 48 hours and you may resume your   usual diet.     Activity: No heavy lifting (over 10 pounds), pushing or pulling until your   post op visit. Your doctor's office may have told you to limit your lifting to less weight, or even no weight.  Be sure to follow those instructions.    Note: You may ride in a car and you may drive when comfortable.     Do not drive, drink alcohol, or sign legal documents for 24 hours, or if taking narcotic pain medication.    Dressings: Remove the dressing 24 hours after surgery. You may shower  48 hours after surgery and you may wash your hair.     If you have steri strips ( appears to be strips of white tape) on   your incision, leave them on.     Drains: If you have a drain, measure and record the drainage. Bring this information with you on your office visit.     Last dose of pain medication 12:32 pm    Medical: Call the doctor for any of the following problems: fever above 101,   severe pain, bleeding, or abdominal distention (swelling).   If constipated you may take any stool softener you choose.     Occasionally small areas of skin numbness or an unpleasant skin sensation can result. Also, you may find that your incision is swollen and tender for a few days.  Some redness around sutures and staples is a normal reaction, but if the discomfort persists or worsens, call you doctor.                                              Exparel information  REMOVE TEAL COLORED BRACELET ON Tuesday ,OCTOBER 23 ,AT 7:45 AM  To help control your pain after surgery, your surgeon injected Exparel (bupicacaine liposome injectable suspension) into your surgical incision just before the end of the procedure.      Exparel is a local analgesic that contains the local anesthetic bupivacaine..   Local anesthetics provide pain relief by numbing the tissue around the surgical site.    Exparel is specifically designed to release pain medication over time an can control pain for up to 72 hours.    In addition to Exparel, your surgeon may provide other pain medications to control your pain.    Each patient is different and responds differently to pain medication.  Depending on how you respond to Exparel, you may require less additional pain medication during your recovery.    Side effects can occur with any medication and it is important not to ignore anything you may be experiencing.  Some patients who received Exparel experienced nausea, vomiting, or constipation.  Rarely, patients who receive bupivacaine (the active ingredient in Exparel) have experienced numbness and tingling in their mouth or lips, lightheadedness, or anxiety.  Speak with your doctor right away if you think you may be experiencing any of these sensations, or if you have other questions regarding possible side effects.    Products that contain bupivacaine, like Exparel, may cause a temporary loss of sensation or the ability to move in the area where bupivacaine was injected.    Other formulations of bupivacaine should not be administered within 96 hours following administrations of Exparel.  Do not remove the teal colored bracelet that you have on for 96 hours.  This bracelet will let other health care workers know that you have received Exparel, and not to give you bupivacaine during this 96 hours.    Fall Prevention  Millions of people fall every year and injure themselves. You may have had anesthesia or sedation which may increase your risk of falling. You may have health issues that put you at an increased risk of falling.     Here are ways to reduce your risk of falling.  ·   · Make your home safe by keeping walkways clear of objects you may trip over.  · Use non-slip pads under rugs. Do not use area rugs or small throw rugs.  · Use  non-slip mats in bathtubs and showers.  · Install handrails and lights on staircases.  · Do not walk in poorly lit areas.  · Do not stand on chairs or wobbly ladders.  · Use caution when reaching overhead or looking upward. This position can cause a loss of balance.  · Be sure your shoes fit properly, have non-slip bottoms and are in good condition.   · Wear shoes both inside and out. Avoid going barefoot or wearing slippers.  · Be cautious when going up and down stairs, curbs, and when walking on uneven sidewalks.  · If your balance is poor, consider using a cane or walker.  · If your fall was related to alcohol use, stop or limit alcohol intake.   · If your fall was related to use of sleeping medicines, talk to your doctor about this. You may need to reduce your dosage at bedtime if you awaken during the night to go to the bathroom.    · To reduce the need for nighttime bathroom trips:  ¨ Avoid drinking fluids for several hours before going to bed  ¨ Empty your bladder before going to bed  ¨ Men can keep a urinal at the bedside  · Stay as active as you can. Balance, flexibility, strength, and endurance all come from exercise. They all play a role in preventing falls. Ask your healthcare provider which types of activity are right for you.  · Get your vision checked on a regular basis.  · If you have pets, know where they are before you stand up or walk so you don't trip over them.  · Use night lights.    Last dose of pain medication given at 12:30 pm

## 2018-10-19 NOTE — OP NOTE
Laparoscopic cholecystectomy      DATE OF PROCEDURE: 10/19/2018       PREOPERATIVE DIAGNOSIS: Symptomatic cholelithiasis.     POSTOPERATIVE DIAGNOSIS: Symptomatic cholelithiasis. Liver masses     PROCEDURE PERFORMED: Laparoscopic cholecystectomy. Lap liver biopsy     SURGEON: Lucien Mayes M.D.     ANESTHESIA: General.     ESTIMATED BLOOD LOSS: 50     DESCRIPTION OF OPERATION: The patient was brought to the Operating Room, placed  on operating room table in supine position. Under adequate anesthesia, prepped  and draped around her abdomen in the usual sterile fashion. Incision was made   in umbilicus through which a 5-mm port was inserted under direct vision. The   patient had her abdomen insufflated with 3.5 liters of CO2. Two other ports   were placed. An epigastric 11 mm and off right subcostal 5 mm port were placed.  The gallbladder was identified and retracted in cephalad fashion. Dissection   was done around the triangle of Calot. The cystic duct and cystic artery were   both identified and divided. The cystic duct was clipped. The gallbladder was   removed from the gallbladder fossa in antegrade fashion and brought out through   the epigastric port site. One of the liver masses was biopsied using the harmonic scalpel and sent to pathology.  Bleeding was controlled with surgicel.  The abdomen was desufflated. The ports were removed   and port sites closed in layers with absorbable suture. Steri-Strips were   applied as well as bandage. The patient was awakened and transported to the   Recovery Room in satisfactory condition.

## 2018-10-19 NOTE — BRIEF OP NOTE
[facility]  Brief Operative Note     SUMMARY     Surgery Date: 10/19/2018     Surgeon(s) and Role:     * Lucien Mayes MD - Primary    Assisting Surgeon: None    Pre-op Diagnosis:  Calculus of gallbladder without cholecystitis without obstruction [K80.20]    Post-op Diagnosis:  Post-Op Diagnosis Codes:     * Calculus of gallbladder without cholecystitis without obstruction [K80.20]    Procedure(s) (LRB):  BIOPSY, LIVER, LAPAROSCOPIC (N/A)  CHOLECYSTECTOMY, LAPAROSCOPIC    Anesthesia: General    Description of the findings of the procedure: lesions on the surface of her liver that were biopsies    Findings/Key Components: same    Estimated Blood Loss: 50 mls         Specimens:   Specimen (12h ago, onward)    Start     Ordered    10/19/18 0829  Specimen to Pathology - Surgery  Once     Comments:  LIVER BIOPSY     Start Status   10/19/18 0829 Collected (10/19/18 0828)       10/19/18 0828    10/19/18 0816  Specimen to Pathology - Surgery  Once     Comments:  GALLBLADDER     Start Status   10/19/18 0816 Collected (10/19/18 0740)       10/19/18 0819    10/19/18 0744  Specimen to Pathology - Surgery  Once     Comments:  Liver bx    For frozen     Start Status   10/19/18 0744 Collected (10/19/18 0744)       10/19/18 0744          Discharge Note    SUMMARY     Admit Date: 10/19/2018    Discharge Date and Time:  10/19/2018 9:06 AM    Hospital Course (synopsis of major diagnoses, care, treatment, and services provided during the course of the hospital stay): uneventful post op course     Final Diagnosis: Post-Op Diagnosis Codes:     * Calculus of gallbladder without cholecystitis without obstruction [K80.20]    Disposition: Home or Self Care    Follow Up/Patient Instructions:     Medications:  Reconciled Home Medications:      Medication List      START taking these medications    HYDROcodone-acetaminophen 5-325 mg per tablet  Commonly known as:  NORCO  Take 1 tablet by mouth every 4 (four) hours as  needed for Pain.          Discharge Procedure Orders   Diet general     Call MD for:  temperature >100.4     Call MD for:  persistent nausea and vomiting     Call MD for:  severe uncontrolled pain     Call MD for:  difficulty breathing, headache or visual disturbances     Call MD for:  redness, tenderness, or signs of infection (pain, swelling, redness, odor or green/yellow discharge around incision site)     Call MD for:  hives     Remove dressing in 24 hours     Shower on day dressing removed (No bath)     Activity as tolerated     Follow-up Information     Lucien Mayes MD In 1 week.    Specialties:  General Surgery, Oncology  Contact information:  [address]  [address] [place] [id number] [phone]

## 2018-10-19 NOTE — PLAN OF CARE
Problem: Surgery Nonspecified (Adult)  Goal: Signs and Symptoms of Listed Potential Problems Will be Absent, Minimized or Managed (Surgery Nonspecified)  Signs and symptoms of listed potential problems will be absent, minimized or managed by discharge/transition of care (reference Surgery Nonspecified (Adult) CPG).   10/19/18 1135   Surgery Nonspecified   Problems Assessed (Surgery) pain;postoperative nausea and vomiting;respiratory compromise;situational response;bleeding/anemia   Problems Present (Surgery) pain   Ready for transfer to same day surgery. Pain 4/10.

## 2018-10-19 NOTE — ANESTHESIA PREPROCEDURE EVALUATION
10/19/2018  Melany Hackett is a 42 y.o., female.    Anesthesia Evaluation         Review of Systems  Anesthesia Hx:  No problems with previous Anesthesia   Social:  Smoker    Hematology/Oncology:  Hematology Normal   Oncology Normal     EENT/Dental:EENT/Dental Normal   Cardiovascular:  Cardiovascular Normal     Pulmonary:   COPD    Renal/:  Renal/ Normal     Hepatic/GI:  Hepatic/GI Normal    Musculoskeletal:  Musculoskeletal Normal    Neurological:   Headaches    Endocrine:  Endocrine Normal    Dermatological:  Skin Normal    Psych:  Psychiatric Normal           Physical Exam  General:  Well nourished    Airway/Jaw/Neck:  Airway Findings: Mallampati: II TM Distance: 4 - 6 cm      Dental:  Dental Findings: (multiple gold caps)   Chest/Lungs:  Chest/Lungs Clear    Heart/Vascular:  Heart Findings: Normal       Mental Status:  Mental Status Findings:  Cooperative, Alert and Oriented         Anesthesia Plan  Type of Anesthesia, risks & benefits discussed:  Anesthesia Type:  general  Patient's Preference:   Intra-op Monitoring Plan: standard ASA monitors  Intra-op Monitoring Plan Comments:   Post Op Pain Control Plan: multimodal analgesia, IV/PO Opioids PRN and per primary service following discharge from PACU  Post Op Pain Control Plan Comments:   Induction:    Beta Blocker:  Patient is not currently on a Beta-Blocker (No further documentation required).       Informed Consent: Patient understands risks and agrees with Anesthesia plan.  Questions answered. Anesthesia consent signed with patient.  ASA Score: 2     Day of Surgery Review of History & Physical:    H&P update referred to the provider.  H&P completed by Anesthesiologist.   Anesthesia Plan Notes: npo        Ready For Surgery From Anesthesia Perspective.

## 2018-10-19 NOTE — H&P
Subjective:       Patient ID: Melany Hackett is a 42 y.o. female.     Chief Complaint: Cholelithiasis     HPI 41 yo female with gallstones and symptoms for about a year and getting worse  Review of Systems   Constitutional: Negative.    HENT: Negative.    Eyes: Negative.    Respiratory: Negative.    Cardiovascular: Negative.    Gastrointestinal: Negative.    Endocrine: Negative.    Musculoskeletal: Negative.    Skin: Negative.    Allergic/Immunologic: Negative.    Neurological: Negative.    Hematological: Negative.    Psychiatric/Behavioral: Negative.    All other systems reviewed and are negative.      Objective:   Physical Exam   Constitutional: She is oriented to person, place, and time. She appears well-developed and well-nourished.   HENT:   Head: Normocephalic and atraumatic.   Right Ear: External ear normal.   Left Ear: External ear normal.   Nose: Nose normal.   Mouth/Throat: Oropharynx is clear and moist.   Eyes: Conjunctivae and EOM are normal. Pupils are equal, round, and reactive to light.   Neck: Normal range of motion. Neck supple.   Cardiovascular: Normal rate, regular rhythm, normal heart sounds and intact distal pulses.   Pulmonary/Chest: Effort normal and breath sounds normal.   Abdominal: Soft. Bowel sounds are normal. There is tenderness in the right upper quadrant and epigastric area.   Musculoskeletal: Normal range of motion.   Neurological: She is alert and oriented to person, place, and time. She has normal reflexes.   Skin: Skin is warm and dry.   Psychiatric: She has a normal mood and affect. Her behavior is normal. Thought content normal.   Vitals reviewed.      Assessment:       1. Gallstones        Plan:       To the OR for lap dee with the risks and possible complications were explained and she agrees to proceed as planned

## 2018-10-19 NOTE — ANESTHESIA POSTPROCEDURE EVALUATION
"Anesthesia Post Evaluation    Patient: Melany Hackett    Procedure(s) Performed: Procedure(s) (LRB):  BIOPSY, LIVER, LAPAROSCOPIC (N/A)  CHOLECYSTECTOMY, LAPAROSCOPIC    Final Anesthesia Type: general  Patient location during evaluation: PACU  Patient participation: Yes- Able to Participate  Level of consciousness: awake and alert, oriented and awake  Post-procedure vital signs: reviewed and stable  Pain management: adequate  Airway patency: patent  PONV status at discharge: No PONV  Anesthetic complications: no      Cardiovascular status: blood pressure returned to baseline, hemodynamically stable and stable  Respiratory status: unassisted and spontaneous ventilation  Hydration status: euvolemic  Follow-up not needed.        Visit Vitals  /71 (BP Location: Left arm, Patient Position: Lying)   Pulse 81   Temp 36.8 °C (98.2 °F) (Oral)   Resp 16   Ht 5' 6" (1.676 m)   Wt 72.1 kg (159 lb)   SpO2 100%   Breastfeeding? No   BMI 25.66 kg/m²       Pain/Mehdi Score: Pain Assessment Performed: Yes (10/19/2018  8:55 AM)  Presence of Pain: complains of pain/discomfort (10/19/2018  8:55 AM)  Pain Rating Prior to Med Admin: 4 (10/19/2018  8:55 AM)  Mehdi Score: 8 (10/19/2018  8:55 AM)        "

## 2018-10-19 NOTE — TRANSFER OF CARE
"Anesthesia Transfer of Care Note    Patient: Melany Hackett    Procedure(s) Performed: Procedure(s) (LRB):  BIOPSY, LIVER, LAPAROSCOPIC (N/A)  CHOLECYSTECTOMY, LAPAROSCOPIC    Patient location: PACU    Anesthesia Type: general    Transport from OR: Transported from OR on room air with adequate spontaneous ventilation    Post pain: adequate analgesia    Post assessment: no apparent anesthetic complications    Post vital signs: stable    Level of consciousness: awake    Nausea/Vomiting: no nausea/vomiting    Complications: none    Transfer of care protocol was followed      Last vitals:   Visit Vitals  /71 (BP Location: Left arm, Patient Position: Lying)   Pulse 81   Temp 36.8 °C (98.2 °F) (Oral)   Resp 16   Ht 5' 6" (1.676 m)   Wt 72.1 kg (159 lb)   SpO2 100%   Breastfeeding? No   BMI 25.66 kg/m²     "

## 2018-10-23 ENCOUNTER — TELEPHONE (OUTPATIENT)
Dept: SURGERY | Facility: CLINIC | Age: 43
End: 2018-10-23

## 2018-10-23 NOTE — TELEPHONE ENCOUNTER
Pt called c/o of pain and has not had bowel movement since surgery.  Pt instructed to take milk of magnesia and take Aleve in between taking her pain medicine.  Pt will call back if she does not improve

## 2018-10-24 ENCOUNTER — OFFICE VISIT (OUTPATIENT)
Dept: SURGERY | Facility: CLINIC | Age: 43
End: 2018-10-24
Payer: MEDICAID

## 2018-10-24 ENCOUNTER — HOSPITAL ENCOUNTER (OUTPATIENT)
Dept: RADIOLOGY | Facility: HOSPITAL | Age: 43
Discharge: HOME | End: 2018-10-24
Attending: SURGERY
Payer: COMMERCIAL

## 2018-10-24 DIAGNOSIS — K80.20 CALCULUS OF GALLBLADDER WITHOUT CHOLECYSTITIS WITHOUT OBSTRUCTION: ICD-10-CM

## 2018-10-24 DIAGNOSIS — K80.20 CALCULUS OF GALLBLADDER WITHOUT CHOLECYSTITIS WITHOUT OBSTRUCTION: Primary | ICD-10-CM

## 2018-10-24 PROCEDURE — 74018 RADEX ABDOMEN 1 VIEW: CPT | Mod: 26,,, | Performed by: RADIOLOGY

## 2018-10-24 PROCEDURE — 99024 POSTOP FOLLOW-UP VISIT: CPT | Mod: S$GLB,,, | Performed by: SURGERY

## 2018-10-24 PROCEDURE — 74018 RADEX ABDOMEN 1 VIEW: CPT | Mod: TC,FY

## 2018-10-24 NOTE — PROGRESS NOTES
Subjective:       Patient ID: Melany Hackett is a 42 y.o. female.    Chief Complaint: Post-op Evaluation    HPI 43 yo female with recent lap dee and liver biopsy with post op abdominal pain  Review of Systems   Constitutional: Negative.    HENT: Negative.    Eyes: Negative.    Respiratory: Negative.    Cardiovascular: Negative.    Gastrointestinal: Positive for abdominal distention, abdominal pain, nausea and vomiting.   Endocrine: Negative.    Musculoskeletal: Negative.    Skin: Negative.    Allergic/Immunologic: Negative.    Neurological: Negative.    Hematological: Negative.    Psychiatric/Behavioral: Negative.    All other systems reviewed and are negative.      Objective:      Physical Exam   Constitutional: She is oriented to person, place, and time. She appears well-developed and well-nourished.   HENT:   Head: Normocephalic and atraumatic.   Right Ear: External ear normal.   Left Ear: External ear normal.   Nose: Nose normal.   Mouth/Throat: Oropharynx is clear and moist.   Eyes: Conjunctivae and EOM are normal. Pupils are equal, round, and reactive to light.   Neck: Normal range of motion. Neck supple.   Cardiovascular: Normal rate, regular rhythm, normal heart sounds and intact distal pulses.   Pulmonary/Chest: Effort normal and breath sounds normal.   Abdominal: Soft. Bowel sounds are normal. She exhibits distension. There is tenderness. There is guarding.   Musculoskeletal: Normal range of motion.   Neurological: She is alert and oriented to person, place, and time. She has normal reflexes.   Skin: Skin is warm and dry.   Psychiatric: She has a normal mood and affect. Her behavior is normal. Thought content normal.   Vitals reviewed.      Assessment:       1. Calculus of gallbladder without cholecystitis without obstruction      nausea and vomiting and abdominal pain post op  Plan:       I will send her to get labs and a KUB then follow up

## 2018-10-25 ENCOUNTER — TELEPHONE (OUTPATIENT)
Dept: SURGERY | Facility: CLINIC | Age: 43
End: 2018-10-25

## 2018-10-31 ENCOUNTER — OFFICE VISIT (OUTPATIENT)
Dept: SURGERY | Facility: CLINIC | Age: 43
End: 2018-10-31
Payer: COMMERCIAL

## 2018-10-31 DIAGNOSIS — K80.20 CALCULUS OF GALLBLADDER WITHOUT CHOLECYSTITIS WITHOUT OBSTRUCTION: Primary | ICD-10-CM

## 2018-10-31 DIAGNOSIS — K76.9 LIVER DISEASE: Primary | ICD-10-CM

## 2018-10-31 DIAGNOSIS — K76.9 LIVER DISEASE: ICD-10-CM

## 2018-10-31 PROCEDURE — 99024 POSTOP FOLLOW-UP VISIT: CPT | Mod: S$GLB,,, | Performed by: SURGERY

## 2018-10-31 NOTE — PROGRESS NOTES
Subjective:       Patient ID: Melany Hackett is a 42 y.o. female.    Chief Complaint: Post-op Evaluation    HPI 43 yo female a/p lap dee and liver biopsy for masses in the liver now without discomfort  Review of Systems   Constitutional: Negative.    HENT: Negative.    Eyes: Negative.    Respiratory: Negative.    Cardiovascular: Negative.    Gastrointestinal: Negative.    Endocrine: Negative.    Musculoskeletal: Negative.    Skin: Negative.    Allergic/Immunologic: Negative.    Neurological: Negative.    Hematological: Negative.    Psychiatric/Behavioral: Negative.    All other systems reviewed and are negative.      Objective:      Physical Exam   Constitutional: She is oriented to person, place, and time. She appears well-developed and well-nourished.   HENT:   Head: Normocephalic and atraumatic.   Right Ear: External ear normal.   Left Ear: External ear normal.   Nose: Nose normal.   Mouth/Throat: Oropharynx is clear and moist.   Eyes: Conjunctivae and EOM are normal. Pupils are equal, round, and reactive to light.   Neck: Normal range of motion. Neck supple.   Cardiovascular: Normal rate, regular rhythm, normal heart sounds and intact distal pulses.   Pulmonary/Chest: Effort normal and breath sounds normal.   Abdominal: Soft. Bowel sounds are normal.       Musculoskeletal: Normal range of motion.   Neurological: She is alert and oriented to person, place, and time. She has normal reflexes.   Skin: Skin is warm and dry.   Psychiatric: She has a normal mood and affect. Her behavior is normal. Thought content normal.   Vitals reviewed.      Assessment:       1. Calculus of gallbladder without cholecystitis without obstruction    2. 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

## 2018-11-02 ENCOUNTER — TELEPHONE (OUTPATIENT)
Dept: SURGERY | Facility: CLINIC | Age: 43
End: 2018-11-02

## 2018-11-06 DIAGNOSIS — K76.9 LIVER DISEASE: Primary | ICD-10-CM

## 2018-11-09 ENCOUNTER — DOCUMENTATION ONLY (OUTPATIENT)
Dept: TRANSPLANT | Facility: CLINIC | Age: 43
End: 2018-11-09

## 2018-11-09 NOTE — NURSING
Pt records reviewed.   Pt will be referred to Hepatology.  Liver disease  Initialreferral received  from the workque.   Referring Provider/diagnosis  MARICRUZ MCNULTY A  Referral letter sent to patient.

## 2018-11-09 NOTE — LETTER
November 9, 2018    Melany Hackett  [address] [facility] [address]      Dear Melany Hackett:    Your doctor has referred you to the [facility]. You will be contacted by our office and an initial appointment will then be scheduled for you.    We look forward to seeing you soon. If you have any further questions, please contact us at [phone].       Sincerely,        [facility]   [facility] [place], [place] [id number] [phone]

## 2018-11-09 NOTE — LETTER
November 9, 2018    Lucien Mayes MD  [address] [facility]  [address] [place] [id number]      Dear Dr. Mayes    Patient: Melany Hackett   MR Number: [id number]   YOB: 1975     Thank you for the referral of Melany Hackett to the [facility]. An initial appointment will be scheduled for your patient with one of our Hepatologists.      Thank you again for your trust in our program.  If there is anything we can do for you or your staff, please feel free to contact us.        Sincerely,        [facility]  [address] [place] [id number]  [phone]

## 2018-12-12 ENCOUNTER — OFFICE VISIT (OUTPATIENT)
Dept: HEPATOLOGY | Facility: CLINIC | Age: 43
End: 2018-12-12
Payer: COMMERCIAL

## 2018-12-12 ENCOUNTER — LAB VISIT (OUTPATIENT)
Dept: LAB | Facility: HOSPITAL | Age: 43
End: 2018-12-12
Attending: INTERNAL MEDICINE
Payer: COMMERCIAL

## 2018-12-12 VITALS
WEIGHT: 166 LBS | HEIGHT: 65 IN | BODY MASS INDEX: 27.66 KG/M2 | RESPIRATION RATE: 18 BRPM | DIASTOLIC BLOOD PRESSURE: 85 MMHG | OXYGEN SATURATION: 97 % | SYSTOLIC BLOOD PRESSURE: 139 MMHG | HEART RATE: 71 BPM

## 2018-12-12 DIAGNOSIS — R16.0 LIVER MASSES: ICD-10-CM

## 2018-12-12 DIAGNOSIS — K73.9 CHRONIC HEPATITIS, UNSPECIFIED: ICD-10-CM

## 2018-12-12 DIAGNOSIS — K73.9 CHRONIC HEPATITIS, UNSPECIFIED: Primary | ICD-10-CM

## 2018-12-12 LAB
AFP SERPL-MCNC: 1.3 NG/ML
ALBUMIN SERPL BCP-MCNC: 2.7 G/DL
ALP SERPL-CCNC: 770 U/L
ALT SERPL W/O P-5'-P-CCNC: 58 U/L
AST SERPL-CCNC: 59 U/L
BILIRUB DIRECT SERPL-MCNC: 0.4 MG/DL
BILIRUB SERPL-MCNC: 0.8 MG/DL
CERULOPLASMIN SERPL-MCNC: 49 MG/DL
CREAT SERPL-MCNC: 0.8 MG/DL
ERYTHROCYTE [DISTWIDTH] IN BLOOD BY AUTOMATED COUNT: 16.8 %
EST. GFR  (AFRICAN AMERICAN): >60 ML/MIN/1.73 M^2
EST. GFR  (NON AFRICAN AMERICAN): >60 ML/MIN/1.73 M^2
FERRITIN SERPL-MCNC: 98 NG/ML
HBV SURFACE AG SERPL QL IA: NEGATIVE
HCT VFR BLD AUTO: 35.8 %
HCV AB SERPL QL IA: NEGATIVE
HGB BLD-MCNC: 11.9 G/DL
IGG SERPL-MCNC: 2410 MG/DL
IGM SERPL-MCNC: 174 MG/DL
INR PPP: 1
IRON SERPL-MCNC: 26 UG/DL
MCH RBC QN AUTO: 25.4 PG
MCHC RBC AUTO-ENTMCNC: 33.2 G/DL
MCV RBC AUTO: 76 FL
PLATELET # BLD AUTO: 353 K/UL
PMV BLD AUTO: 10 FL
PROT SERPL-MCNC: 8.5 G/DL
PROTHROMBIN TIME: 10.3 SEC
RBC # BLD AUTO: 4.69 M/UL
SATURATED IRON: 6 %
SODIUM SERPL-SCNC: 137 MMOL/L
TOTAL IRON BINDING CAPACITY: 459 UG/DL
TRANSFERRIN SERPL-MCNC: 310 MG/DL
WBC # BLD AUTO: 4.97 K/UL

## 2018-12-12 PROCEDURE — 86803 HEPATITIS C AB TEST: CPT

## 2018-12-12 PROCEDURE — 85610 PROTHROMBIN TIME: CPT

## 2018-12-12 PROCEDURE — 83540 ASSAY OF IRON: CPT

## 2018-12-12 PROCEDURE — 82390 ASSAY OF CERULOPLASMIN: CPT

## 2018-12-12 PROCEDURE — 86235 NUCLEAR ANTIGEN ANTIBODY: CPT

## 2018-12-12 PROCEDURE — 80076 HEPATIC FUNCTION PANEL: CPT

## 2018-12-12 PROCEDURE — 86256 FLUORESCENT ANTIBODY TITER: CPT

## 2018-12-12 PROCEDURE — 84295 ASSAY OF SERUM SODIUM: CPT

## 2018-12-12 PROCEDURE — 85027 COMPLETE CBC AUTOMATED: CPT

## 2018-12-12 PROCEDURE — 86038 ANTINUCLEAR ANTIBODIES: CPT

## 2018-12-12 PROCEDURE — 82728 ASSAY OF FERRITIN: CPT

## 2018-12-12 PROCEDURE — 82784 ASSAY IGA/IGD/IGG/IGM EACH: CPT | Mod: 59

## 2018-12-12 PROCEDURE — 99999 PR PBB SHADOW E&M-EST. PATIENT-LVL IV: CPT | Mod: PBBFAC,,, | Performed by: INTERNAL MEDICINE

## 2018-12-12 PROCEDURE — 82565 ASSAY OF CREATININE: CPT

## 2018-12-12 PROCEDURE — 99204 OFFICE O/P NEW MOD 45 MIN: CPT | Mod: S$GLB,,, | Performed by: INTERNAL MEDICINE

## 2018-12-12 PROCEDURE — 82787 IGG 1 2 3 OR 4 EACH: CPT

## 2018-12-12 PROCEDURE — 82105 ALPHA-FETOPROTEIN SERUM: CPT

## 2018-12-12 PROCEDURE — 82784 ASSAY IGA/IGD/IGG/IGM EACH: CPT

## 2018-12-12 PROCEDURE — 87340 HEPATITIS B SURFACE AG IA: CPT

## 2018-12-12 NOTE — PROGRESS NOTES
Hepatology Consult Note    Referring provider: Dr. Lucien Mayes    Chief complaint:   Chief Complaint   Patient presents with    Hepatic Disease       HPI:  Melany Hackett is a pleasant 43 y.o. femalewho was referred to Hepatology Clinic for consultation of had concerns including Hepatic Disease..      Patient has no hx of liver disease. Has no family hx of liver disease. No hx of autoimmune disease. Denies alcohol use.    10/19/18:  Dr. Mayes performed laparoscopic cholecystectomy.   He also saw liver mass and 2 liver biopsies done.    FINAL PATHOLOGIC DIAGNOSIS  Part 1  Hepatic parenchyma (wedge biopsy):  -Extensive granulomatous inflammation with minimal fibrinoid necrosis  -Minimal macrovesicular fatty degeneration  -No malignancy is identified  -Special stains for acid-fast bacilli and fungi will be performed    Part 2  Gallbladder:  -Mild chronic cholecystitis    Part 3  Liver (wedge excision):  -Extensive granulomatous inflammation and fibrosis exhibiting minimal fibrinoid necrosis  -Minimal macrovesicular fatty degeneration  -No malignancy is identified    -Special stains for acid-fast bacilli and fungi are being performed on part 1.  Microscopic Examination  Part 1  Represented are sections of hepatic parenchyma with overlying capsule showing extensive fibrosis consisting of  irregular streaming bands of moderately cellular fibrous tissue coursing among distorted but otherwise  normal-appearing hepatic parenchyma. Within these fibrous areas are numerous granulomas consisting of  aggregates of epithelioid macrophages, scattered lymphocytes, and some giant cells, predominantly foreign-body type.  Some of these areas of blood small blood vessels are present centrally, although vasculitis is not appreciated. A small number of neutrophils is also identified within inflammatory infiltrate. These fibrotic foci are well demarcated from adjacent hepatic parenchyma, which exhibits a normal cord pattern.  Hepatocytes show focal macrovesicular fatty degeneration but are free of other cytoplasmic or nuclear inclusions. Sinusoids are relatively well demarcated while Kupffer cells appear slightly increased in number. No hepatocellular necrosis is definitively identified. No malignancy is identified in multiple levels representing the entirety of the specimen. At its periphery, cautery artifact is present. Some of the granulomas show a peripheral layer of fibroblastic and collagenous tissue imparting an onionskin appearance. Minimal foci centrally located fibrinous material suggestive of fibrinoid necrosis are noted. No caseation is seen.    Part 2  Sections of gallbladder show a mild to moderate lymphoplasmacytic inflammatory infiltrate present within the lamina  propria and extending to deep serosa. RA sinuses are present. No acute inflammatory component is identified.    Part 3  Represented are multiple sections of hepatic parenchyma showing moderate replacement by broad, irregularly shaped stones of fibrous tissue within which are granulomas. Findings within this specimen are essentially identical to those described within part 1, above. Hepatocytes, however, exhibit areas of some obscuring of the cord pattern combined with areas of sinusoidal dilatation without significant congestion. No malignancy is identified. Slight bile ductular proliferation is noted within some fibrotic areas, as are occasional foreign body type giant cells. Prominent cautery artifact is present at the periphery.    Patient Active Problem List   Diagnosis    Sickle cell trait    Fibroid    Gallstones    Gallstone       Past Medical History:   Diagnosis Date    Allergy     seasonal    Headache     Hyperlipemia     Sickle cell trait     Thyroid disease        Past Surgical History:   Procedure Laterality Date    BIOPSY, LIVER, LAPAROSCOPIC N/A 10/19/2018    [facility]    CHOLECYSTECTOMY       CHOLECYSTECTOMY, LAPAROSCOPIC  10/19/2018    [facility] at [facility] OR    COLONOSCOPY      LAPAROSCOPIC BIOPSY OF LIVER N/A 10/19/2018    Procedure: BIOPSY, LIVER, LAPAROSCOPIC;  Surgeon: Lucien Mayes MD;  Location: [facility] OR;  Service: General;  Laterality: N/A;  RN PREOP 10/12/2018-----NEED H/P    LAPAROSCOPIC CHOLECYSTECTOMY  10/19/2018    Procedure: CHOLECYSTECTOMY, LAPAROSCOPIC;  Surgeon: Lucien Mayes MD;  Location: [facility] OR;  Service: General;;    TUBAL LIGATION      UTERINE FIBROID SURGERY         Family History   Problem Relation Age of Onset    Hypertension Mother     Hyperlipidemia Mother     Heart disease Father     Kidney disease Father     Heart disease Sister     Breast cancer Maternal Aunt     Hypertension Maternal Aunt     Clotting disorder Maternal Aunt     Anxiety disorder Maternal Aunt     Heart attack Maternal Aunt     Diabetes Sister     Hyperlipidemia Sister     Hypertension Sister        Social History     Socioeconomic History    Marital status: Single     Spouse name: None    Number of children: None    Years of education: None    Highest education level: None   Social Needs    Financial resource strain: None    Food insecurity - worry: None    Food insecurity - inability: None    Transportation needs - medical: None    Transportation needs - non-medical: None   Occupational History    None   Tobacco Use    Smoking status: Current Every Day Smoker     Packs/day: 0.50    Smokeless tobacco: Current User   Substance and Sexual Activity    Alcohol use: Yes    Drug use: No    Sexual activity: None   Other Topics Concern    None   Social History Narrative    None       No current outpatient medications on file.     No current facility-administered medications for this visit.        Review of patient's allergies indicates:  No Known Allergies    Review of Systems   Constitutional: Negative for chills, fever, malaise/fatigue and weight  "loss.   HENT: Negative for congestion, nosebleeds and sore throat.    Eyes: Negative for blurred vision, double vision and photophobia.   Respiratory: Negative for cough and shortness of breath.    Cardiovascular: Negative for chest pain, palpitations, orthopnea and leg swelling.   Gastrointestinal: Positive for nausea. Negative for abdominal pain, blood in stool, constipation, diarrhea, melena and vomiting.   Genitourinary: Negative for dysuria.   Musculoskeletal: Negative for falls and joint pain.   Skin: Negative for itching and rash.   Neurological: Negative for dizziness, tremors and weakness.   Endo/Heme/Allergies: Does not bruise/bleed easily.   Psychiatric/Behavioral: Negative for depression and substance abuse. The patient is not nervous/anxious and does not have insomnia.        Vitals:    12/12/18 0944   BP: 139/85   Pulse: 71   Resp: 18   SpO2: 97%   Weight: 75.3 kg (166 lb 0.1 oz)   Height: 5' 5" (1.651 m)       Physical Exam   Constitutional: She is oriented to person, place, and time. She appears well-developed and well-nourished.   HENT:   Head: Normocephalic and atraumatic.   Mouth/Throat: Oropharynx is clear and moist.   Eyes: Conjunctivae are normal. No scleral icterus.   Neck: Normal range of motion.   Cardiovascular: Normal rate, regular rhythm and normal heart sounds.   Pulmonary/Chest: Effort normal and breath sounds normal. No respiratory distress. She has no rales.   Abdominal: Soft. Bowel sounds are normal. She exhibits no distension. There is no tenderness. No hernia.   Musculoskeletal: She exhibits no edema.   Lymphadenopathy:     She has no cervical adenopathy.   Neurological: She is alert and oriented to person, place, and time.   Skin: Skin is warm and dry. No rash noted.   Psychiatric: She has a normal mood and affect. Her behavior is normal. Her mood appears not anxious.   Nursing note and vitals reviewed.      LABS: I personally reviewed pertinent laboratory findings.    Lab Results "   Component Value Date    ALT 51 (H) 10/24/2018    AST 53 (H) 10/24/2018    ALKPHOS 795 (H) 10/24/2018    BILITOT 0.9 10/24/2018       Lab Results   Component Value Date    WBC 5.62 10/24/2018    HGB 12.2 10/24/2018    HCT 36.9 (L) 10/24/2018    MCV 75 (L) 10/24/2018     10/24/2018       Lab Results   Component Value Date     (L) 10/24/2018    K 4.5 10/24/2018     10/24/2018    CO2 30 (H) 10/24/2018    BUN 10 10/24/2018    CREATININE 0.8 10/24/2018    CALCIUM 9.8 10/24/2018    ANIONGAP 4 (L) 10/24/2018    ESTGFRAFRICA >60 10/24/2018    EGFRNONAA >60 10/24/2018       No results found for: HAV, HEPAIGM, HEPBIGM, HEPBCAB, HBEAG, HEPCAB    No results found for: SMOOTHMUSCAB, MITOAB    I personally reviewed all recent lab results.  I personally reviewed imaging studies.    Assessment:  43 y.o. female presenting with     1. Chronic hepatitis, unspecified    2. Liver masses      Liver masses mentioned by Dr. Mayes op note, however not seen on prior outside US in Sept 2018 (media section).  No hx of underlying liver disease. Liver biopsies during lap dee - biliary inflammation, some granuloma, ddx: chronic biliary obstruction vs autoimmune biliary diseases    Recommendation(s):  - will order serologies/autoimmune markers/iron/ceruloplasmin to r/o other possible causes of chronic liver disease for the sake of thoroughness in work-up  - will obtain liver US w/ doppler  - follow up with PCP    A total of 45 minutes were spent face-to-face with the patient during this encounter and over half of that time was spent on counseling and coordination of care.  We discussed in depth the nature of the patient's liver disease, the management plan in details. I also educated the patient about lifestyle modifications which may improve hepatic steatosis, overweight/obesity, insulin resistance and high blood pressure issues. I have provided the patient with an opportunity to ask questions and have all questions  answered to his satisfaction.       I have sent communication to the referring physician and/or primary care provider.    Katalina Muñoz MD  Staff Physician  Hepatology and Liver Transplant  [facility]  [facility] [place]

## 2018-12-12 NOTE — LETTER
December 12, 2018      Lucien Mayes MD  [address] [facility] [address]  [address] [place] [id number]           [facility] - Hepatology  [address]  [place] [address]  Phone: [phone]  Fax: [phone]          Patient: Melany Hackett   MR Number: [id number]   YOB: 1975   Date of Visit: 12/12/2018       Dear Dr. Lucien Mayes:    Thank you for referring Melany Hackett to me for evaluation. Attached you will find relevant portions of my assessment and plan of care.    If you have questions, please do not hesitate to call me. I look forward to following Melany Hackett along with you.    Sincerely,    Katalina Muñoz MD    Enclosure  CC:  No Recipients    If you would like to receive this communication electronically, please contact [email or web address] or [phone] to request more information on [employer] access.    For providers and/or their staff who would like to refer a patient to [facility], please contact us through our one-stop-shop provider referral line, [facility], at [phone].    If you feel you have received this communication in error or would no longer like to receive these types of communications, please e-mail [email or web address]

## 2018-12-13 LAB
ANA SER QL IF: NORMAL
ANTI SM ANTIBODY: 1.18 EU
ANTI-SM INTERPRETATION: NEGATIVE
IGG4 SER-MCNC: 49 MG/DL

## 2018-12-14 LAB — SMOOTH MUSCLE AB TITR SER IF: ABNORMAL {TITER}

## 2018-12-21 ENCOUNTER — HOSPITAL ENCOUNTER (OUTPATIENT)
Dept: RADIOLOGY | Facility: HOSPITAL | Age: 43
Discharge: HOME | End: 2018-12-21
Attending: INTERNAL MEDICINE
Payer: COMMERCIAL

## 2018-12-21 DIAGNOSIS — K73.9 CHRONIC HEPATITIS, UNSPECIFIED: ICD-10-CM

## 2018-12-21 DIAGNOSIS — R16.0 LIVER MASSES: ICD-10-CM

## 2018-12-21 PROCEDURE — 93975 VASCULAR STUDY: CPT | Mod: TC

## 2018-12-21 PROCEDURE — 93975 VASCULAR STUDY: CPT | Mod: 26,,, | Performed by: RADIOLOGY

## 2018-12-21 PROCEDURE — 76705 ECHO EXAM OF ABDOMEN: CPT | Mod: TC

## 2018-12-21 PROCEDURE — 76705 ECHO EXAM OF ABDOMEN: CPT | Mod: 26,XS,, | Performed by: RADIOLOGY

## 2019-05-13 ENCOUNTER — HOSPITAL ENCOUNTER (EMERGENCY)
Facility: HOSPITAL | Age: 44
Discharge: HOME | End: 2019-05-13
Attending: EMERGENCY MEDICINE
Payer: COMMERCIAL

## 2019-05-13 VITALS
SYSTOLIC BLOOD PRESSURE: 130 MMHG | HEART RATE: 79 BPM | HEIGHT: 66 IN | BODY MASS INDEX: 24.75 KG/M2 | RESPIRATION RATE: 14 BRPM | OXYGEN SATURATION: 100 % | WEIGHT: 154 LBS | TEMPERATURE: 98 F | DIASTOLIC BLOOD PRESSURE: 83 MMHG

## 2019-05-13 DIAGNOSIS — R19.7 VOMITING AND DIARRHEA: Primary | ICD-10-CM

## 2019-05-13 DIAGNOSIS — R11.10 VOMITING AND DIARRHEA: Primary | ICD-10-CM

## 2019-05-13 LAB
B-HCG UR QL: NEGATIVE
CTP QC/QA: YES

## 2019-05-13 PROCEDURE — 99284 EMERGENCY DEPT VISIT MOD MDM: CPT | Mod: 25

## 2019-05-13 PROCEDURE — 25000003 PHARM REV CODE 250: Performed by: PHYSICIAN ASSISTANT

## 2019-05-13 PROCEDURE — 81025 URINE PREGNANCY TEST: CPT | Performed by: PHYSICIAN ASSISTANT

## 2019-05-13 RX ORDER — ONDANSETRON 4 MG/1
4 TABLET, ORALLY DISINTEGRATING ORAL
Status: COMPLETED | OUTPATIENT
Start: 2019-05-13 | End: 2019-05-13

## 2019-05-13 RX ORDER — ONDANSETRON 2 MG/ML
4 INJECTION INTRAMUSCULAR; INTRAVENOUS
Status: DISCONTINUED | OUTPATIENT
Start: 2019-05-13 | End: 2019-05-13

## 2019-05-13 RX ORDER — DICYCLOMINE HYDROCHLORIDE 20 MG/1
20 TABLET ORAL 2 TIMES DAILY
Qty: 20 TABLET | Refills: 0 | Status: SHIPPED | OUTPATIENT
Start: 2019-05-13 | End: 2019-06-12

## 2019-05-13 RX ORDER — ONDANSETRON 4 MG/1
4 TABLET, ORALLY DISINTEGRATING ORAL EVERY 8 HOURS PRN
Qty: 10 TABLET | Refills: 0 | Status: SHIPPED | OUTPATIENT
Start: 2019-05-13 | End: 2019-09-03

## 2019-05-13 RX ORDER — DICYCLOMINE HYDROCHLORIDE 10 MG/1
10 CAPSULE ORAL
Status: COMPLETED | OUTPATIENT
Start: 2019-05-13 | End: 2019-05-13

## 2019-05-13 RX ADMIN — ONDANSETRON 4 MG: 4 TABLET, ORALLY DISINTEGRATING ORAL at 10:05

## 2019-05-13 RX ADMIN — DICYCLOMINE HYDROCHLORIDE 10 MG: 10 CAPSULE ORAL at 11:05

## 2019-05-13 NOTE — ED PROVIDER NOTES
"Encounter Date: 5/13/2019    SCRIBE #1 NOTE: I, Rohan Wolf, am scribing for, and in the presence of,  Tamara Saul PA-C. I have scribed the following portions of the note - Other sections scribed: HPI/ROS.       History     Chief Complaint   Patient presents with    Diarrhea     Pt. arrives to ER reports diarrhea and vomiting since last night, states "I went out to eat at a restaurant last night and i've been running to the bathroom since." Pt. denying any abdominal pain.     Emesis     CC: Diarrhea      HPI: This 43 y.o. female presents to the ED for an emergent evaluation of n/v/d and abdominal soreness. Pt reports she ate at [employer] yesterday and began with n/v/d when she got home later that night. This morning, she reports approximately 4 episodes of diarrhea and 2 episodes of vomiting. She thinks she ate something bad. She reports less episodes of v/d this morning than last night. She has been able to drink water and Gatorade. Denies fever, chills, chest pain, SOB, cough, constipation, headache, dizziness, eye pain, and any other associated symptoms.     The history is provided by the patient. No  was used.     Review of patient's allergies indicates:  No Known Allergies  Past Medical History:   Diagnosis Date    Allergy     seasonal    Headache     Hyperlipemia     Sickle cell trait     Thyroid disease      Past Surgical History:   Procedure Laterality Date    BIOPSY, LIVER, LAPAROSCOPIC N/A 10/19/2018    [facility] at [facility] OR    CHOLECYSTECTOMY      CHOLECYSTECTOMY, LAPAROSCOPIC  10/19/2018    [facility] at [facility] OR    COLONOSCOPY      TUBAL LIGATION      UTERINE FIBROID SURGERY       Family History   Problem Relation Age of Onset    Hypertension Mother     Hyperlipidemia Mother     Heart disease Father     Kidney disease Father     Heart disease Sister     Breast cancer Maternal Aunt     Hypertension " Maternal Aunt     Clotting disorder Maternal Aunt     Anxiety disorder Maternal Aunt     Heart attack Maternal Aunt     Diabetes Sister     Hyperlipidemia Sister     Hypertension Sister      Social History     Tobacco Use    Smoking status: Current Every Day Smoker     Packs/day: 0.50    Smokeless tobacco: Never Used   Substance Use Topics    Alcohol use: Yes     Comment: occ    Drug use: No     Review of Systems   Constitutional: Negative for chills and fever.   HENT: Negative for congestion, ear pain, rhinorrhea and sore throat.    Eyes: Negative for pain and visual disturbance.   Respiratory: Negative for cough and shortness of breath.    Cardiovascular: Negative for chest pain.   Gastrointestinal: Positive for abdominal pain (soreness), diarrhea, nausea and vomiting.   Genitourinary: Negative for dysuria.   Musculoskeletal: Negative for back pain and neck pain.   Skin: Negative for rash.   Neurological: Negative for headaches.   All other systems reviewed and are negative.      Physical Exam     Initial Vitals [05/13/19 0941]   BP Pulse Resp Temp SpO2   (!) 153/95 89 16 98.2 °F (36.8 °C) 98 %      MAP       --         Physical Exam    Constitutional: She appears well-developed and well-nourished.   Eyes: Conjunctivae and EOM are normal. Pupils are equal, round, and reactive to light.   Neck: Normal range of motion. Neck supple.   Cardiovascular: Normal rate, regular rhythm and normal heart sounds.   Pulmonary/Chest: Breath sounds normal. Respiratory distress: epigastric.   Abdominal: Soft. She exhibits no mass. There is tenderness. There is no rebound and no guarding.   Skin: Skin is warm. No erythema.   Psychiatric: She has a normal mood and affect.         ED Course   Procedures  Labs Reviewed   POCT URINE PREGNANCY          Imaging Results    None          Medical Decision Making:   Initial Assessment:    Upt negative. PT refused labs or IV fluids. PT requested PO meds. Symptoms improved with bentyl  and zofran. I doubt appendicitis, diverticulitis, gall stones, cholecystitis, pancreatitis, or bowel obstruction. Etiology of pt symptoms unknown at this time. PT is afebrile and non-toxic appearing. PT appears well hydrated. Pt given strong abdominal pain precautions. Will d/c home with zofran and bentyl.             Scribe Attestation:   Scribe #1: I performed the above scribed service and the documentation accurately describes the services I performed. I attest to the accuracy of the note.    Attending Attestation:           Physician Attestation for Scribe:  Physician Attestation Statement for Scribe #1: I, Tamara Saul PA-C, reviewed documentation, as scribed by Rohan Wolf in my presence, and it is both accurate and complete.                    Clinical Impression:       ICD-10-CM ICD-9-CM   1. Vomiting and diarrhea R11.10 787.03    R19.7 787.91         Disposition:   Disposition: Discharged  Condition: Stable                        ARINA Mccormick  05/19/19 1546

## 2019-05-13 NOTE — ED TRIAGE NOTES
The states her vomiting and diarrhea started yesterday. Reports urinary frequency, but denies fever.

## 2019-06-21 ENCOUNTER — HOSPITAL ENCOUNTER (EMERGENCY)
Facility: HOSPITAL | Age: 44
Discharge: HOME | End: 2019-06-21
Attending: EMERGENCY MEDICINE
Payer: COMMERCIAL

## 2019-06-21 VITALS
TEMPERATURE: 98 F | SYSTOLIC BLOOD PRESSURE: 132 MMHG | BODY MASS INDEX: 25.23 KG/M2 | HEART RATE: 60 BPM | DIASTOLIC BLOOD PRESSURE: 80 MMHG | HEIGHT: 66 IN | OXYGEN SATURATION: 95 % | WEIGHT: 157 LBS | RESPIRATION RATE: 18 BRPM

## 2019-06-21 DIAGNOSIS — R11.11 NON-INTRACTABLE VOMITING WITHOUT NAUSEA, UNSPECIFIED VOMITING TYPE: ICD-10-CM

## 2019-06-21 DIAGNOSIS — R10.30 LOWER ABDOMINAL PAIN: Primary | ICD-10-CM

## 2019-06-21 DIAGNOSIS — R10.13 EPIGASTRIC PAIN: ICD-10-CM

## 2019-06-21 LAB
ALBUMIN SERPL BCP-MCNC: 3.5 G/DL (ref 3.5–5.2)
ALP SERPL-CCNC: 635 U/L (ref 55–135)
ALT SERPL W/O P-5'-P-CCNC: 67 U/L (ref 10–44)
ANION GAP SERPL CALC-SCNC: 12 MMOL/L (ref 8–16)
AST SERPL-CCNC: 59 U/L (ref 10–40)
B-HCG UR QL: NEGATIVE
BACTERIA #/AREA URNS HPF: ABNORMAL /HPF
BASOPHILS # BLD AUTO: 0.02 K/UL (ref 0–0.2)
BASOPHILS NFR BLD: 0.3 % (ref 0–1.9)
BILIRUB SERPL-MCNC: 1.3 MG/DL (ref 0.1–1)
BILIRUB UR QL STRIP: NEGATIVE
BUN SERPL-MCNC: 10 MG/DL (ref 6–20)
CALCIUM SERPL-MCNC: 10.3 MG/DL (ref 8.7–10.5)
CHLORIDE SERPL-SCNC: 102 MMOL/L (ref 95–110)
CLARITY UR: CLEAR
CO2 SERPL-SCNC: 23 MMOL/L (ref 23–29)
COLOR UR: YELLOW
CREAT SERPL-MCNC: 0.9 MG/DL (ref 0.5–1.4)
CTP QC/QA: YES
DIFFERENTIAL METHOD: ABNORMAL
EOSINOPHIL # BLD AUTO: 0 K/UL (ref 0–0.5)
EOSINOPHIL NFR BLD: 0.5 % (ref 0–8)
ERYTHROCYTE [DISTWIDTH] IN BLOOD BY AUTOMATED COUNT: 15.9 % (ref 11.5–14.5)
EST. GFR  (AFRICAN AMERICAN): >60 ML/MIN/1.73 M^2
EST. GFR  (NON AFRICAN AMERICAN): >60 ML/MIN/1.73 M^2
GLUCOSE SERPL-MCNC: 84 MG/DL (ref 70–110)
GLUCOSE UR QL STRIP: NEGATIVE
HCT VFR BLD AUTO: 40.8 % (ref 37–48.5)
HGB BLD-MCNC: 13.4 G/DL (ref 12–16)
HGB UR QL STRIP: ABNORMAL
KETONES UR QL STRIP: NEGATIVE
LEUKOCYTE ESTERASE UR QL STRIP: NEGATIVE
LIPASE SERPL-CCNC: 9 U/L (ref 4–60)
LYMPHOCYTES # BLD AUTO: 1.3 K/UL (ref 1–4.8)
LYMPHOCYTES NFR BLD: 23.1 % (ref 18–48)
MCH RBC QN AUTO: 25.5 PG (ref 27–31)
MCHC RBC AUTO-ENTMCNC: 32.8 G/DL (ref 32–36)
MCV RBC AUTO: 78 FL (ref 82–98)
MICROSCOPIC COMMENT: ABNORMAL
MONOCYTES # BLD AUTO: 0.7 K/UL (ref 0.3–1)
MONOCYTES NFR BLD: 12.1 % (ref 4–15)
NEUTROPHILS # BLD AUTO: 3.7 K/UL (ref 1.8–7.7)
NEUTROPHILS NFR BLD: 64 % (ref 38–73)
NITRITE UR QL STRIP: NEGATIVE
NON-SQ EPI CELLS #/AREA URNS HPF: 3 /HPF
PH UR STRIP: 6 [PH] (ref 5–8)
PLATELET # BLD AUTO: 362 K/UL (ref 150–350)
PMV BLD AUTO: 10.6 FL (ref 9.2–12.9)
POTASSIUM SERPL-SCNC: 3.8 MMOL/L (ref 3.5–5.1)
PROT SERPL-MCNC: 9.3 G/DL (ref 6–8.4)
PROT UR QL STRIP: NEGATIVE
RBC # BLD AUTO: 5.25 M/UL (ref 4–5.4)
RBC #/AREA URNS HPF: 0 /HPF (ref 0–4)
SODIUM SERPL-SCNC: 137 MMOL/L (ref 136–145)
SP GR UR STRIP: 1.01 (ref 1–1.03)
SQUAMOUS #/AREA URNS HPF: 10 /HPF
URN SPEC COLLECT METH UR: ABNORMAL
UROBILINOGEN UR STRIP-ACNC: NEGATIVE EU/DL
WBC # BLD AUTO: 5.77 K/UL (ref 3.9–12.7)
WBC #/AREA URNS HPF: 2 /HPF (ref 0–5)

## 2019-06-21 PROCEDURE — 81000 URINALYSIS NONAUTO W/SCOPE: CPT

## 2019-06-21 PROCEDURE — 93010 ELECTROCARDIOGRAM REPORT: CPT | Mod: ,,, | Performed by: INTERNAL MEDICINE

## 2019-06-21 PROCEDURE — 63600175 PHARM REV CODE 636 W HCPCS: Performed by: NURSE PRACTITIONER

## 2019-06-21 PROCEDURE — 25000003 PHARM REV CODE 250: Performed by: NURSE PRACTITIONER

## 2019-06-21 PROCEDURE — 93005 ELECTROCARDIOGRAM TRACING: CPT

## 2019-06-21 PROCEDURE — 96372 THER/PROPH/DIAG INJ SC/IM: CPT

## 2019-06-21 PROCEDURE — 81025 URINE PREGNANCY TEST: CPT | Performed by: PHYSICIAN ASSISTANT

## 2019-06-21 PROCEDURE — 80053 COMPREHEN METABOLIC PANEL: CPT

## 2019-06-21 PROCEDURE — 83690 ASSAY OF LIPASE: CPT

## 2019-06-21 PROCEDURE — 99284 EMERGENCY DEPT VISIT MOD MDM: CPT | Mod: 25

## 2019-06-21 PROCEDURE — 93010 EKG 12-LEAD: ICD-10-PCS | Mod: ,,, | Performed by: INTERNAL MEDICINE

## 2019-06-21 PROCEDURE — 85025 COMPLETE CBC W/AUTO DIFF WBC: CPT

## 2019-06-21 PROCEDURE — 25000003 PHARM REV CODE 250: Performed by: PHYSICIAN ASSISTANT

## 2019-06-21 PROCEDURE — 81003 URINALYSIS AUTO W/O SCOPE: CPT

## 2019-06-21 RX ORDER — DICYCLOMINE HYDROCHLORIDE 10 MG/ML
20 INJECTION INTRAMUSCULAR
Status: COMPLETED | OUTPATIENT
Start: 2019-06-21 | End: 2019-06-21

## 2019-06-21 RX ORDER — ONDANSETRON 4 MG/1
4 TABLET, ORALLY DISINTEGRATING ORAL
Status: COMPLETED | OUTPATIENT
Start: 2019-06-21 | End: 2019-06-21

## 2019-06-21 RX ORDER — ONDANSETRON 4 MG/1
4 TABLET, FILM COATED ORAL EVERY 8 HOURS PRN
Qty: 12 TABLET | Refills: 0 | Status: SHIPPED | OUTPATIENT
Start: 2019-06-21 | End: 2019-09-03

## 2019-06-21 RX ORDER — DICYCLOMINE HYDROCHLORIDE 20 MG/1
20 TABLET ORAL 2 TIMES DAILY PRN
Qty: 10 TABLET | Refills: 0 | Status: SHIPPED | OUTPATIENT
Start: 2019-06-21 | End: 2019-07-21

## 2019-06-21 RX ADMIN — ONDANSETRON 4 MG: 4 TABLET, ORALLY DISINTEGRATING ORAL at 03:06

## 2019-06-21 RX ADMIN — LIDOCAINE HYDROCHLORIDE: 20 SOLUTION ORAL; TOPICAL at 03:06

## 2019-06-21 RX ADMIN — DICYCLOMINE HYDROCHLORIDE 20 MG: 20 INJECTION, SOLUTION INTRAMUSCULAR at 03:06

## 2019-06-21 NOTE — ED PROVIDER NOTES
Encounter Date: 6/21/2019  SORT:   44 y/o female with presenting for evaluation of 1 day history of vomiting. She reports abdominal soreness. Denies abdominal pain, diarrhea. Initial orders placed. JESUSITA Faustin PA-C      History     Chief Complaint   Patient presents with    Emesis     reports vomitting starting yesterday. Deneis fever, and diarrhea     CC: Abdominal Pain and Vomiting    HPI: Melany Hackett, a 43 y.o. female presents to the ED with a 1 day history of vomiting with some abdominal pain described as soreness.  She reports that she was cleaning the bathroom yesterday evening and began to vomit with out any nausea preceding.  She reports 3-4 episodes of vomiting yesterday.  She reports nausea remains.  She reports no fever, chills, or urinary symptoms. She reports a family member was sick with URI symptoms recently.  No medications or treatments have been attempted prior to arrival.  She has had her gallbladder out in October of last year.  She reports some issues with only being able eat small meals since her gallbladder surgery.     The history is provided by the patient. No  was used.     Review of patient's allergies indicates:  No Known Allergies  Past Medical History:   Diagnosis Date    Allergy     seasonal    Headache     Hyperlipemia     Sickle cell trait     Thyroid disease      Past Surgical History:   Procedure Laterality Date    BIOPSY, LIVER, LAPAROSCOPIC N/A 10/19/2018    [facility]    CHOLECYSTECTOMY      CHOLECYSTECTOMY, LAPAROSCOPIC  10/19/2018    [facility]    COLONOSCOPY      TUBAL LIGATION      UTERINE FIBROID SURGERY       Family History   Problem Relation Age of Onset    Hypertension Mother     Hyperlipidemia Mother     Heart disease Father     Kidney disease Father     Heart disease Sister     Breast cancer Maternal Aunt     Hypertension Maternal Aunt     Clotting disorder  Maternal Aunt     Anxiety disorder Maternal Aunt     Heart attack Maternal Aunt     Diabetes Sister     Hyperlipidemia Sister     Hypertension Sister      Social History     Tobacco Use    Smoking status: Current Every Day Smoker     Packs/day: 0.50     Types: Cigarettes    Smokeless tobacco: Never Used   Substance Use Topics    Alcohol use: Yes     Comment: occ    Drug use: No     Review of Systems   Constitutional: Negative for fever.   HENT: Negative for congestion and sore throat.    Respiratory: Negative for cough and shortness of breath.    Gastrointestinal: Positive for abdominal pain and vomiting. Negative for abdominal distention and diarrhea.   Genitourinary: Negative for dysuria.   Musculoskeletal: Negative for back pain and neck pain.   Neurological: Negative for syncope, weakness and headaches.       Physical Exam     Initial Vitals [06/21/19 1455]   BP Pulse Resp Temp SpO2   115/77 83 18 98.1 °F (36.7 °C) 97 %      MAP       --         Physical Exam    Nursing note and vitals reviewed.  Constitutional: She appears well-developed and well-nourished. She is not diaphoretic. She is cooperative.  Non-toxic appearance. No distress.   HENT:   Head: Normocephalic and atraumatic.   Right Ear: External ear normal.   Left Ear: External ear normal.   Eyes: Conjunctivae and EOM are normal.   Neck: Normal range of motion. No tracheal deviation present.   Cardiovascular: Normal rate and regular rhythm.   Pulmonary/Chest: Effort normal and breath sounds normal. No stridor. No tachypnea and no bradypnea. No respiratory distress. She has no wheezes. She has no rhonchi. She has no rales. She exhibits no tenderness.   Abdominal: Soft. Bowel sounds are normal. She exhibits no distension and no mass. There is tenderness. There is no rigidity, no rebound, no guarding, no tenderness at McBurney's point and negative Vanessa's sign.       Mild tenderness to palpation - described as soreness-  in the lower periumbilical  abdomen and epigastric abdomen.  No focal tenderness on right left lower quadrant.  Negative Vanessa sign.   Neurological: She is alert and oriented to person, place, and time. She has normal strength.   Skin: Skin is warm, dry and intact. No rash noted. No cyanosis or erythema. Nails show no clubbing.   Psychiatric: She has a normal mood and affect. Her behavior is normal. Thought content normal.         ED Course   Procedures  Labs Reviewed   CBC W/ AUTO DIFFERENTIAL - Abnormal; Notable for the following components:       Result Value    Mean Corpuscular Volume 78 (*)     Mean Corpuscular Hemoglobin 25.5 (*)     RDW 15.9 (*)     Platelets 362 (*)     All other components within normal limits   COMPREHENSIVE METABOLIC PANEL - Abnormal; Notable for the following components:    Total Protein 9.3 (*)     Total Bilirubin 1.3 (*)     Alkaline Phosphatase 635 (*)     AST 59 (*)     ALT 67 (*)     All other components within normal limits   URINALYSIS, REFLEX TO URINE CULTURE - Abnormal; Notable for the following components:    Occult Blood UA 1+ (*)     All other components within normal limits    Narrative:     Preferred Collection Type->Urine, Clean Catch   URINALYSIS MICROSCOPIC - Abnormal; Notable for the following components:    Non-Squam Epith 3 (*)     All other components within normal limits    Narrative:     Preferred Collection Type->Urine, Clean Catch   LIPASE   POCT URINE PREGNANCY     EKG Readings: (Independently Interpreted)   Initial Reading: No STEMI. Rhythm: Normal Sinus Rhythm. Heart Rate: 64. Ectopy: No Ectopy. Conduction: Normal. ST Segments: Normal ST Segments. T Waves: Normal. Clinical Impression: Normal Sinus Rhythm and Sinus Arrhythmia       Imaging Results    None          Medical Decision Making:   History:   Old Medical Records: I decided to obtain old medical records.  Old Records Summarized: records from clinic visits.       <> Summary of Records: Liver ultrasound on 12/12/2018 showing  splenomegaly, cholecystectomy, and heterogeneous echotexture possibly representing cirrhosis.  Most recent liver function from that same date shows a AST 59, ALT of 58, alk-phos of 770.  Normal coags.       APC / Resident Notes:   This is an evaluation of a 43 y.o. female that presents to the Emergency Department for vomiting and Abdominal Pain described as soreness from vomiting. Physical Exam shows a non-toxic, afebrile, and well appearing female.  Her abdomen is soft with some mild tenderness in the upper mid epigastric abdomen as well as the lower infraumbilical abdomen.  No tenderness in the right or left lower quadrants.  No peritoneal signs or guarding. Bowel sounds are regular.  Mucous membranes are moist. Vital Signs Are Reassuring. RESULTS:  UPT negative. CBC with no leukocytosis.  H&H 13.4 and 40.8.  Decreased MCV.  Urinalysis without infection.  Liver enzymes elevated, similar to previous enzymes.  Alk-phos is improved from last labs.  She has no right upper quadrant tenderness. EKG without evidence of acute ischemia or arrhythmia.    My overall impression is abdominal pain with vomiting. Given the exam and laboratory studies, I considered, but at this time, do not suspect an appendicitis, ischemic bowel, bowel perforation, bowel obstruction,  pancreatitis, UTI, pyelonephritis, kidney stone, diverticulitis, acute liver failure, Acute MI, or cholecystitis.     D/C Information: Abdominal Pain Precautions. The diagnosis, treatment plan, instructions for follow-up and reevaluation with her PCP as well as ED return precautions were discussed and understanding was verbalized. All questions or concerns have been addressed.  SOPHIE Juárez, FNP-C              ED Course as of Jun 21 1955 Fri Jun 21, 2019   1724 Reassessment:  Prior to discharge patient reports her abdominal pain has completely resolved.  Her nausea is improved.  She reports no abdominal pain on re-examination.  Her abdomen is soft with no  tenderness.    [AF]      ED Course User Index  [AF] SENDY Callejas     Clinical Impression:       ICD-10-CM ICD-9-CM   1. Lower abdominal pain R10.30 789.09   2. Epigastric pain R10.13 789.06   3. Non-intractable vomiting without nausea, unspecified vomiting type R11.11 787.03         Disposition:   Disposition: Discharged  Condition: Stable                        SENDY Callejas  06/21/19 1955

## 2019-06-21 NOTE — ED TRIAGE NOTES
Pt reports having nausea and vomiting since yesterday evening.  Reports she was constantly vomiting for 3 minutes with 2 episodes.  Reports having 3 episodes of emesis today.  Denies blood in vomit.  Denies f/c/d.  Reports granddaughter recently sick with cold without vomiting.

## 2019-06-21 NOTE — DISCHARGE INSTRUCTIONS
Please return to the Emergency Department for any new or worsening symptoms including: worsening abdominal pain, dark\black\bloody bowel movements, vomiting blood, hard abdomen, fever, chest pain, shortness of breath, loss of consciousness or any other concerns.     Please follow up with your Primary Care Provider within in the week. If you do not have a Primary Care Provider, you may contact the one listed on your discharge paperwork or you may also call the [facility] Appointment Desk at [phone] to schedule an appointment with a Primary Care Provider.     Zofran as needed for nausea or vomiting.  Bentyl as needed for abdominal pain or cramping.

## 2019-09-03 ENCOUNTER — HOSPITAL ENCOUNTER (EMERGENCY)
Facility: HOSPITAL | Age: 44
Discharge: HOME | End: 2019-09-03
Attending: EMERGENCY MEDICINE
Payer: MEDICAID

## 2019-09-03 VITALS
BODY MASS INDEX: 23.78 KG/M2 | TEMPERATURE: 98 F | DIASTOLIC BLOOD PRESSURE: 69 MMHG | SYSTOLIC BLOOD PRESSURE: 134 MMHG | HEART RATE: 80 BPM | OXYGEN SATURATION: 100 % | RESPIRATION RATE: 16 BRPM | WEIGHT: 148 LBS | HEIGHT: 66 IN

## 2019-09-03 DIAGNOSIS — K52.9 GASTROENTERITIS: Primary | ICD-10-CM

## 2019-09-03 LAB

## 2019-09-03 PROCEDURE — 81003 URINALYSIS AUTO W/O SCOPE: CPT

## 2019-09-03 PROCEDURE — 25000003 PHARM REV CODE 250: Performed by: PHYSICIAN ASSISTANT

## 2019-09-03 PROCEDURE — 99284 EMERGENCY DEPT VISIT MOD MDM: CPT | Mod: 25

## 2019-09-03 PROCEDURE — 81025 URINE PREGNANCY TEST: CPT | Performed by: NURSE PRACTITIONER

## 2019-09-03 RX ORDER — DICYCLOMINE HYDROCHLORIDE 20 MG/1
20 TABLET ORAL 2 TIMES DAILY PRN
Qty: 10 TABLET | Refills: 0 | Status: SHIPPED | OUTPATIENT
Start: 2019-09-03 | End: 2019-10-03

## 2019-09-03 RX ORDER — DICYCLOMINE HYDROCHLORIDE 10 MG/1
20 CAPSULE ORAL
Status: COMPLETED | OUTPATIENT
Start: 2019-09-03 | End: 2019-09-03

## 2019-09-03 RX ORDER — ONDANSETRON 4 MG/1
4 TABLET, FILM COATED ORAL EVERY 8 HOURS PRN
Qty: 12 TABLET | Refills: 0 | Status: SHIPPED | OUTPATIENT
Start: 2019-09-03 | End: 2021-04-28 | Stop reason: CLARIF

## 2019-09-03 RX ORDER — ONDANSETRON 4 MG/1
4 TABLET, ORALLY DISINTEGRATING ORAL
Status: COMPLETED | OUTPATIENT
Start: 2019-09-03 | End: 2019-09-03

## 2019-09-03 RX ORDER — PANTOPRAZOLE SODIUM 40 MG/1
40 TABLET, DELAYED RELEASE ORAL
Status: COMPLETED | OUTPATIENT
Start: 2019-09-03 | End: 2019-09-03

## 2019-09-03 RX ORDER — ONDANSETRON 2 MG/ML
4 INJECTION INTRAMUSCULAR; INTRAVENOUS
Status: DISCONTINUED | OUTPATIENT
Start: 2019-09-03 | End: 2019-09-03

## 2019-09-03 RX ORDER — PANTOPRAZOLE SODIUM 20 MG/1
20 TABLET, DELAYED RELEASE ORAL DAILY
Qty: 30 TABLET | Refills: 0 | Status: SHIPPED | OUTPATIENT
Start: 2019-09-03 | End: 2021-03-05

## 2019-09-03 RX ADMIN — PANTOPRAZOLE SODIUM 40 MG: 40 TABLET, DELAYED RELEASE ORAL at 03:09

## 2019-09-03 RX ADMIN — DICYCLOMINE HYDROCHLORIDE 20 MG: 10 CAPSULE ORAL at 03:09

## 2019-09-03 RX ADMIN — ONDANSETRON 4 MG: 4 TABLET, ORALLY DISINTEGRATING ORAL at 03:09

## 2019-09-03 NOTE — ED TRIAGE NOTES
The pt states she has lower abdominal pain that started yesterday. Denies fever at home. Reports n/v/d.

## 2019-09-03 NOTE — ED PROVIDER NOTES
Encounter Date: 9/3/2019    SCRIBE #1 NOTE: I, Benson Velasquez, am scribing for, and in the presence of,  Moe Bustos PA-C. I have scribed the following portions of the note - Other sections scribed: HPI, ROS.       History     Chief Complaint   Patient presents with    Abdominal Pain     abd pain and nausea after eating sushi yesterday     CC: Abdominal Pain    HPI: This 43 y.o. Female with seasonal allergies, headaches, HLD, sickle cell trait and thyroid disease presents to the ED for an evaluation of moderate 6/10 cramping abdominal pain, mild dysuria, urinary frequency, nausea, emesis and diarrhea since yesterday. Pt reports her symptoms began 1 hour after eating sushi. She had 5 episodes of vomiting and 1 episode of watery diarrhea in the past 24 hours. Pt denies vaginal bleeding or vaginal discharge.    The history is provided by the patient. No  was used.     Review of patient's allergies indicates:  No Known Allergies  Past Medical History:   Diagnosis Date    Allergy     seasonal    Headache     Hyperlipemia     Sickle cell trait     Thyroid disease      Past Surgical History:   Procedure Laterality Date    BIOPSY, LIVER, LAPAROSCOPIC N/A 10/19/2018    [facility] at [facility] OR    CHOLECYSTECTOMY      CHOLECYSTECTOMY, LAPAROSCOPIC  10/19/2018    [facility] OR    COLONOSCOPY      TUBAL LIGATION      UTERINE FIBROID SURGERY       Family History   Problem Relation Age of Onset    Hypertension Mother     Hyperlipidemia Mother     Heart disease Father     Kidney disease Father     Heart disease Sister     Breast cancer Maternal Aunt     Hypertension Maternal Aunt     Clotting disorder Maternal Aunt     Anxiety disorder Maternal Aunt     Heart attack Maternal Aunt     Diabetes Sister     Hyperlipidemia Sister     Hypertension Sister      Social History     Tobacco Use    Smoking status: Current Every Day Smoker      Packs/day: 0.50     Types: Cigarettes    Smokeless tobacco: Never Used   Substance Use Topics    Alcohol use: Not Currently    Drug use: No     Review of Systems   Constitutional: Negative for chills and fever.   HENT: Negative for congestion, rhinorrhea and sore throat.    Eyes: Negative for pain.   Respiratory: Negative for shortness of breath.    Cardiovascular: Negative for chest pain.   Gastrointestinal: Positive for abdominal pain, diarrhea, nausea and vomiting.   Genitourinary: Positive for dysuria and frequency. Negative for hematuria.   Musculoskeletal: Negative for back pain and neck pain.   Skin: Negative for rash.   Neurological: Negative for dizziness, syncope and light-headedness.   Psychiatric/Behavioral: The patient is not nervous/anxious.        Physical Exam     Initial Vitals [09/03/19 1328]   BP Pulse Resp Temp SpO2   128/73 72 16 98 °F (36.7 °C) 98 %      MAP       --         Physical Exam    Nursing note and vitals reviewed.  Constitutional: She appears well-developed and well-nourished. No distress.   HENT:   Head: Normocephalic and atraumatic.   Nose: Nose normal.   Mouth/Throat: Oropharynx is clear and moist.   Eyes: EOM are normal. Pupils are equal, round, and reactive to light.   Neck: Normal range of motion. Neck supple.   Cardiovascular: Normal rate, regular rhythm and normal heart sounds. Exam reveals no gallop and no friction rub.    No murmur heard.  Pulmonary/Chest: Effort normal and breath sounds normal. No respiratory distress. She has no wheezes. She has no rhonchi. She has no rales.   Abdominal: Soft. Bowel sounds are normal. There is no tenderness. There is no rebound and no guarding.   Musculoskeletal: Normal range of motion.   Neurological: She is alert and oriented to person, place, and time. She has normal strength. No cranial nerve deficit or sensory deficit.   Skin: Skin is warm and dry. Capillary refill takes less than 2 seconds.   Psychiatric: She has a normal mood  and affect.         ED Course   Procedures  Labs Reviewed   URINALYSIS, REFLEX TO URINE CULTURE    Narrative:     Preferred Collection Type->Urine, Clean Catch   POCT URINE PREGNANCY          Imaging Results    None          Medical Decision Making:   Differential Diagnosis:   Differential diagnosis includes but is not limited to:  Gastroenteritis, GERD, pancreatitis, cholecystitis, cholelithiasis, appendicitis, diverticulitis, UTI, pyelonephritis, obstructive uropathy, nephrolithiasis  Clinical Tests:   Lab Tests: Ordered and Reviewed  ED Management:  This is an evaluation of a 43 y.o. female who presents to the ED for generalized abdominal pain, nausea vomiting.  Vital signs are stable.   Afebrile.  Patient is nontoxic appearing and in no acute distress. Abdomen is soft and nontender to palpation. No rebound tenderness or guarding. Lungs are clear to auscultation.  Heart sounds are normal. CBC, CMP and lipase were ordered.  However, patient adamantly refused blood work in requested p.o. medications.  I discussed with the patient that lab work was essential to properly assess the source of patient's abdominal pain. However, patient continued to adamantly refuse.  UA shows no signs of infection.  UPT negative. Patient treated the ED with pantoprazole, Bentyl and Zofran p.o. with improvement of pain. Etiology patient's symptoms likely secondary to gastroenteritis.  Will discharge patient home with Bentyl and Zofran.  Patient encouraged to follow up with PCP.    Patient given return precautions and instructed to return to the emergency department for any new or worsening symptoms. Patient verbalized understanding and agreed with plan.               Scribe Attestation:   Scribe #1: I performed the above scribed service and the documentation accurately describes the services I performed. I attest to the accuracy of the note.    Attending Attestation:           Physician Attestation for Scribe:  Physician Attestation  Statement for Scribe #1: I, Moe Bustos PA-C, reviewed documentation, as scribed by Benson Velasquez in my presence, and it is both accurate and complete.                    Clinical Impression:       ICD-10-CM ICD-9-CM   1. Gastroenteritis K52.9 558.9                                Moe Bustos PA-C  09/03/19 2039

## 2019-10-22 DIAGNOSIS — Z12.31 ENCOUNTER FOR SCREENING MAMMOGRAM FOR MALIGNANT NEOPLASM OF BREAST: Primary | ICD-10-CM

## 2019-10-22 DIAGNOSIS — K21.9 GASTROESOPHAGEAL REFLUX DISEASE WITHOUT ESOPHAGITIS: ICD-10-CM

## 2019-10-22 DIAGNOSIS — R10.11 RUQ PAIN: ICD-10-CM

## 2019-10-22 DIAGNOSIS — K80.20 CALCULUS OF GALLBLADDER WITHOUT CHOLECYSTITIS WITHOUT OBSTRUCTION: ICD-10-CM

## 2019-10-25 ENCOUNTER — HOSPITAL ENCOUNTER (OUTPATIENT)
Dept: RADIOLOGY | Facility: HOSPITAL | Age: 44
Discharge: HOME | End: 2019-10-25
Attending: HOSPITALIST
Payer: COMMERCIAL

## 2019-10-25 DIAGNOSIS — Z12.31 ENCOUNTER FOR SCREENING MAMMOGRAM FOR MALIGNANT NEOPLASM OF BREAST: ICD-10-CM

## 2019-10-25 PROCEDURE — 77067 SCR MAMMO BI INCL CAD: CPT | Mod: 26,,, | Performed by: RADIOLOGY

## 2019-10-25 PROCEDURE — 77067 MAMMO DIGITAL SCREENING BILAT WITH CAD: ICD-10-PCS | Mod: 26,,, | Performed by: RADIOLOGY

## 2019-10-25 PROCEDURE — 77067 SCR MAMMO BI INCL CAD: CPT | Mod: TC

## 2019-11-15 ENCOUNTER — HOSPITAL ENCOUNTER (OUTPATIENT)
Dept: RADIOLOGY | Facility: HOSPITAL | Age: 44
Discharge: HOME | End: 2019-11-15
Attending: HOSPITALIST
Payer: COMMERCIAL

## 2019-11-15 DIAGNOSIS — R10.11 RUQ PAIN: ICD-10-CM

## 2019-11-15 DIAGNOSIS — K21.9 GASTROESOPHAGEAL REFLUX DISEASE WITHOUT ESOPHAGITIS: ICD-10-CM

## 2019-11-15 DIAGNOSIS — K80.20 CALCULUS OF GALLBLADDER WITHOUT CHOLECYSTITIS WITHOUT OBSTRUCTION: ICD-10-CM

## 2019-11-15 PROCEDURE — 76700 US EXAM ABDOM COMPLETE: CPT | Mod: TC

## 2019-11-15 PROCEDURE — 76700 US EXAM ABDOM COMPLETE: CPT | Mod: 26,,, | Performed by: RADIOLOGY

## 2019-11-15 PROCEDURE — 76700 US ABDOMEN COMPLETE: ICD-10-PCS | Mod: 26,,, | Performed by: RADIOLOGY

## 2019-12-20 ENCOUNTER — HOSPITAL ENCOUNTER (EMERGENCY)
Facility: HOSPITAL | Age: 44
Discharge: HOME | End: 2019-12-21
Attending: EMERGENCY MEDICINE
Payer: COMMERCIAL

## 2019-12-20 VITALS
HEART RATE: 87 BPM | RESPIRATION RATE: 19 BRPM | WEIGHT: 140 LBS | OXYGEN SATURATION: 99 % | SYSTOLIC BLOOD PRESSURE: 104 MMHG | BODY MASS INDEX: 22.5 KG/M2 | TEMPERATURE: 98 F | HEIGHT: 66 IN | DIASTOLIC BLOOD PRESSURE: 66 MMHG

## 2019-12-20 DIAGNOSIS — M79.662 BILATERAL CALF PAIN: Primary | ICD-10-CM

## 2019-12-20 DIAGNOSIS — M79.661 BILATERAL CALF PAIN: Primary | ICD-10-CM

## 2019-12-20 PROCEDURE — 99283 EMERGENCY DEPT VISIT LOW MDM: CPT

## 2019-12-21 RX ORDER — METHOCARBAMOL 500 MG/1
1000 TABLET, FILM COATED ORAL 3 TIMES DAILY PRN
Qty: 30 TABLET | Refills: 0 | Status: SHIPPED | OUTPATIENT
Start: 2019-12-21 | End: 2019-12-26

## 2019-12-21 NOTE — DISCHARGE INSTRUCTIONS
Robaxin as needed for muscle stiffness/soreness. Be aware, this medication is sedating.  Do not mix with alcohol or any other sedating medications.  Do not drive or operate machinery when taking this medication.     Follow-up with primary care provider for re-evaluation.  Return to this ED if you begin with leg swelling, if unable to tolerate pain, if any other problems occur.

## 2019-12-21 NOTE — ED PROVIDER NOTES
Encounter Date: 12/20/2019       History     Chief Complaint   Patient presents with    Spasms     Patient reports bilateral calves pain since this morning. Patient reports hx of muscle spasms in calves. Ptient took aleve last at 0800.      44-year-old female with history of headaches, seasonal allergies, hyperlipidemia, thyroid disease, chronic hepatitis with liver masses, with chief complaint bilateral calf spasms intermittently x1 month.  Spasms are not associated with walking or ambulation.  Denies history of PAD or claudication.  No trauma. No erythema or warmth.  She states her calf will cramp when she is lying in bed, then are sore afterwards.  Denies calf swelling. No unilateral swelling. Symptoms are acute, intermittent, severity 5/10.  No alleviating or exacerbating factors.  No radiation of symptoms.        Review of patient's allergies indicates:  No Known Allergies  Past Medical History:   Diagnosis Date    Allergy     seasonal    Headache     Hyperlipemia     Sickle cell trait     Thyroid disease      Past Surgical History:   Procedure Laterality Date    CHOLECYSTECTOMY      COLONOSCOPY      LAPAROSCOPIC BIOPSY OF LIVER N/A 10/19/2018    Procedure: BIOPSY, LIVER, LAPAROSCOPIC;  Surgeon: Lucien Mayes MD;  Location: [facility] OR;  Service: General;  Laterality: N/A;  RN PREOP 10/12/2018-----NEED H/P    LAPAROSCOPIC CHOLECYSTECTOMY  10/19/2018    Procedure: CHOLECYSTECTOMY, LAPAROSCOPIC;  Surgeon: Lucien Mayes MD;  Location: [facility] OR;  Service: General;;    TUBAL LIGATION      UTERINE FIBROID SURGERY       Family History   Problem Relation Age of Onset    Hypertension Mother     Hyperlipidemia Mother     Heart disease Father     Kidney disease Father     Heart disease Sister     Breast cancer Maternal Aunt     Hypertension Maternal Aunt     Clotting disorder Maternal Aunt     Anxiety disorder Maternal Aunt     Heart attack Maternal Aunt     Diabetes Sister     Hyperlipidemia  Sister     Hypertension Sister      Social History     Tobacco Use    Smoking status: Current Every Day Smoker     Packs/day: 0.50     Types: Cigarettes    Smokeless tobacco: Never Used   Substance Use Topics    Alcohol use: Not Currently    Drug use: No     Review of Systems   Constitutional: Negative for chills and fever.   HENT: Negative for congestion, rhinorrhea and sore throat.    Eyes: Negative.  Negative for discharge and redness.   Respiratory: Negative for cough, chest tightness and shortness of breath.    Cardiovascular: Negative for chest pain.   Gastrointestinal: Negative for abdominal pain, nausea and vomiting.   Endocrine: Negative.    Genitourinary: Negative for dysuria, flank pain, frequency and hematuria.   Musculoskeletal: Negative for back pain, myalgias, neck pain and neck stiffness.        Bilateral calf pain   Skin: Negative for rash.   Neurological: Negative for dizziness, weakness, light-headedness and headaches.   Hematological: Does not bruise/bleed easily.   Psychiatric/Behavioral: Negative.    All other systems reviewed and are negative.      Physical Exam     Initial Vitals [12/20/19 2232]   BP Pulse Resp Temp SpO2   104/66 87 19 98.4 °F (36.9 °C) 99 %      MAP       --         Physical Exam    Nursing note and vitals reviewed.  Constitutional: She appears well-developed and well-nourished. She is not diaphoretic. No distress.   Well-appearing and nontoxic.  Resting comfortably on exam table.  Ambulating about the ED with normal, steady gait.   HENT:   Head: Normocephalic and atraumatic.   Eyes: Conjunctivae and EOM are normal. Pupils are equal, round, and reactive to light.   Neck: Normal range of motion. Neck supple. No tracheal deviation present.   Cardiovascular: Intact distal pulses.   1+ PT bilaterally    No pretibial edema.  No unilateral leg swelling.   Pulmonary/Chest: No stridor.   Musculoskeletal: Normal range of motion.   No calf tenderness.   Lymphadenopathy:     She  has no cervical adenopathy.   Neurological: She is alert and oriented to person, place, and time.   Skin: Skin is warm and dry. Capillary refill takes less than 2 seconds.   Psychiatric: She has a normal mood and affect. Her behavior is normal. Judgment and thought content normal.         ED Course   Procedures  Labs Reviewed - No data to display       Imaging Results    None          Medical Decision Making:   Differential Diagnosis:   Muscle spasm, dehydration, rhabdomyolysis  ED Management:  No dark or discolored urination.  No generalized myalgias. Doubt rhabdomyolysis.     No unilateral leg swelling to suggest DVT.  No calf swelling or tenderness on exam.  No evidence of bacterial process.  Will treat with muscle relaxers and have her see her primary.                                 Clinical Impression:       ICD-10-CM ICD-9-CM   1. Bilateral calf pain M79.661 729.5    M79.662          Disposition:   Disposition: Discharged  Condition: Stable                     Miki Carvalho PA-C  12/21/19 0625

## 2021-03-05 ENCOUNTER — LAB VISIT (OUTPATIENT)
Dept: LAB | Facility: HOSPITAL | Age: 46
End: 2021-03-05
Attending: INTERNAL MEDICINE
Payer: COMMERCIAL

## 2021-03-05 ENCOUNTER — OFFICE VISIT (OUTPATIENT)
Dept: HEPATOLOGY | Facility: CLINIC | Age: 46
End: 2021-03-05
Payer: COMMERCIAL

## 2021-03-05 VITALS
SYSTOLIC BLOOD PRESSURE: 119 MMHG | TEMPERATURE: 99 F | DIASTOLIC BLOOD PRESSURE: 76 MMHG | HEART RATE: 67 BPM | RESPIRATION RATE: 16 BRPM | WEIGHT: 165.56 LBS | BODY MASS INDEX: 26.61 KG/M2 | HEIGHT: 66 IN

## 2021-03-05 DIAGNOSIS — K76.9 LIVER DISEASE, UNSPECIFIED: ICD-10-CM

## 2021-03-05 DIAGNOSIS — K74.60 CIRRHOSIS OF LIVER WITHOUT ASCITES, UNSPECIFIED HEPATIC CIRRHOSIS TYPE: ICD-10-CM

## 2021-03-05 DIAGNOSIS — K76.9 LIVER LESION: ICD-10-CM

## 2021-03-05 DIAGNOSIS — R10.9 ABDOMINAL PAIN, UNSPECIFIED ABDOMINAL LOCATION: ICD-10-CM

## 2021-03-05 DIAGNOSIS — K76.9 LIVER LESION: Primary | ICD-10-CM

## 2021-03-05 LAB
AFP SERPL-MCNC: 1.3 NG/ML (ref 0–8.4)
ALBUMIN SERPL BCP-MCNC: 3.4 G/DL (ref 3.5–5.2)
ALP SERPL-CCNC: 456 U/L (ref 55–135)
ALT SERPL W/O P-5'-P-CCNC: 50 U/L (ref 10–44)
AMYLASE SERPL-CCNC: 114 U/L (ref 20–110)
ANION GAP SERPL CALC-SCNC: 8 MMOL/L (ref 8–16)
AST SERPL-CCNC: 48 U/L (ref 10–40)
BASOPHILS # BLD AUTO: 0.06 K/UL (ref 0–0.2)
BASOPHILS NFR BLD: 0.9 % (ref 0–1.9)
BILIRUB SERPL-MCNC: 1 MG/DL (ref 0.1–1)
BUN SERPL-MCNC: 9 MG/DL (ref 6–20)
CALCIUM SERPL-MCNC: 9.5 MG/DL (ref 8.7–10.5)
CHLORIDE SERPL-SCNC: 104 MMOL/L (ref 95–110)
CO2 SERPL-SCNC: 24 MMOL/L (ref 23–29)
CREAT SERPL-MCNC: 0.7 MG/DL (ref 0.5–1.4)
DIFFERENTIAL METHOD: ABNORMAL
EOSINOPHIL # BLD AUTO: 0.2 K/UL (ref 0–0.5)
EOSINOPHIL NFR BLD: 3.4 % (ref 0–8)
ERYTHROCYTE [DISTWIDTH] IN BLOOD BY AUTOMATED COUNT: 15.3 % (ref 11.5–14.5)
EST. GFR  (AFRICAN AMERICAN): >60 ML/MIN/1.73 M^2
EST. GFR  (NON AFRICAN AMERICAN): >60 ML/MIN/1.73 M^2
GLUCOSE SERPL-MCNC: 83 MG/DL (ref 70–110)
HCT VFR BLD AUTO: 40.4 % (ref 37–48.5)
HGB BLD-MCNC: 13.4 G/DL (ref 12–16)
IMM GRANULOCYTES # BLD AUTO: 0.01 K/UL (ref 0–0.04)
IMM GRANULOCYTES NFR BLD AUTO: 0.2 % (ref 0–0.5)
INR PPP: 1 (ref 0.8–1.2)
LYMPHOCYTES # BLD AUTO: 2.3 K/UL (ref 1–4.8)
LYMPHOCYTES NFR BLD: 35.8 % (ref 18–48)
MCH RBC QN AUTO: 27.2 PG (ref 27–31)
MCHC RBC AUTO-ENTMCNC: 33.2 G/DL (ref 32–36)
MCV RBC AUTO: 82 FL (ref 82–98)
MONOCYTES # BLD AUTO: 0.7 K/UL (ref 0.3–1)
MONOCYTES NFR BLD: 10.6 % (ref 4–15)
NEUTROPHILS # BLD AUTO: 3.1 K/UL (ref 1.8–7.7)
NEUTROPHILS NFR BLD: 49.1 % (ref 38–73)
NRBC BLD-RTO: 0 /100 WBC
PLATELET # BLD AUTO: 298 K/UL (ref 150–350)
PMV BLD AUTO: 10.9 FL (ref 9.2–12.9)
POTASSIUM SERPL-SCNC: 4.2 MMOL/L (ref 3.5–5.1)
PROT SERPL-MCNC: 8.2 G/DL (ref 6–8.4)
PROTHROMBIN TIME: 10.6 SEC (ref 9–12.5)
RBC # BLD AUTO: 4.92 M/UL (ref 4–5.4)
SODIUM SERPL-SCNC: 136 MMOL/L (ref 136–145)
WBC # BLD AUTO: 6.4 K/UL (ref 3.9–12.7)

## 2021-03-05 PROCEDURE — 99999 PR PBB SHADOW E&M-EST. PATIENT-LVL III: ICD-10-PCS | Mod: PBBFAC,,, | Performed by: INTERNAL MEDICINE

## 2021-03-05 PROCEDURE — 85610 PROTHROMBIN TIME: CPT | Performed by: INTERNAL MEDICINE

## 2021-03-05 PROCEDURE — 85025 COMPLETE CBC W/AUTO DIFF WBC: CPT | Performed by: INTERNAL MEDICINE

## 2021-03-05 PROCEDURE — 99999 PR PBB SHADOW E&M-EST. PATIENT-LVL III: CPT | Mod: PBBFAC,,, | Performed by: INTERNAL MEDICINE

## 2021-03-05 PROCEDURE — 99214 OFFICE O/P EST MOD 30 MIN: CPT | Mod: S$GLB,,, | Performed by: INTERNAL MEDICINE

## 2021-03-05 PROCEDURE — 80053 COMPREHEN METABOLIC PANEL: CPT | Performed by: INTERNAL MEDICINE

## 2021-03-05 PROCEDURE — 99214 PR OFFICE/OUTPT VISIT, EST, LEVL IV, 30-39 MIN: ICD-10-PCS | Mod: S$GLB,,, | Performed by: INTERNAL MEDICINE

## 2021-03-05 PROCEDURE — 82105 ALPHA-FETOPROTEIN SERUM: CPT | Performed by: INTERNAL MEDICINE

## 2021-03-05 PROCEDURE — 82150 ASSAY OF AMYLASE: CPT | Performed by: INTERNAL MEDICINE

## 2021-03-05 PROCEDURE — 36415 COLL VENOUS BLD VENIPUNCTURE: CPT | Performed by: INTERNAL MEDICINE

## 2021-03-05 RX ORDER — ALBUTEROL SULFATE 90 UG/1
AEROSOL, METERED RESPIRATORY (INHALATION) DAILY PRN
COMMUNITY
Start: 2021-01-05

## 2021-03-09 DIAGNOSIS — Z01.818 PRE-OP TESTING: ICD-10-CM

## 2021-03-10 ENCOUNTER — HOSPITAL ENCOUNTER (OUTPATIENT)
Dept: RADIOLOGY | Facility: HOSPITAL | Age: 46
Discharge: HOME | End: 2021-03-10
Attending: INTERNAL MEDICINE
Payer: COMMERCIAL

## 2021-03-10 DIAGNOSIS — K76.9 LIVER DISEASE, UNSPECIFIED: ICD-10-CM

## 2021-03-10 DIAGNOSIS — K74.60 CIRRHOSIS OF LIVER WITHOUT ASCITES, UNSPECIFIED HEPATIC CIRRHOSIS TYPE: ICD-10-CM

## 2021-03-10 PROCEDURE — 74183 MRI ABD W/O CNTR FLWD CNTR: CPT | Mod: 26,,, | Performed by: RADIOLOGY

## 2021-03-10 PROCEDURE — 74183 MRI ABDOMEN W WO CONTRAST: ICD-10-PCS | Mod: 26,,, | Performed by: RADIOLOGY

## 2021-03-10 PROCEDURE — 74183 MRI ABD W/O CNTR FLWD CNTR: CPT | Mod: TC

## 2021-03-10 PROCEDURE — 25500020 PHARM REV CODE 255: Performed by: INTERNAL MEDICINE

## 2021-03-10 PROCEDURE — A9585 GADOBUTROL INJECTION: HCPCS | Performed by: INTERNAL MEDICINE

## 2021-03-10 RX ORDER — GADOBUTROL 604.72 MG/ML
10 INJECTION INTRAVENOUS
Status: COMPLETED | OUTPATIENT
Start: 2021-03-10 | End: 2021-03-10

## 2021-03-10 RX ADMIN — GADOBUTROL 10 ML: 604.72 INJECTION INTRAVENOUS at 05:03

## 2021-03-11 ENCOUNTER — TELEPHONE (OUTPATIENT)
Dept: HEPATOLOGY | Facility: CLINIC | Age: 46
End: 2021-03-11

## 2021-03-15 ENCOUNTER — LAB VISIT (OUTPATIENT)
Dept: INTERNAL MEDICINE | Facility: CLINIC | Age: 46
End: 2021-03-15
Payer: COMMERCIAL

## 2021-03-15 DIAGNOSIS — Z01.818 PRE-OP TESTING: ICD-10-CM

## 2021-03-15 PROCEDURE — U0003 INFECTIOUS AGENT DETECTION BY NUCLEIC ACID (DNA OR RNA); SEVERE ACUTE RESPIRATORY SYNDROME CORONAVIRUS 2 (SARS-COV-2) (CORONAVIRUS DISEASE [COVID-19]), AMPLIFIED PROBE TECHNIQUE, MAKING USE OF HIGH THROUGHPUT TECHNOLOGIES AS DESCRIBED BY CMS-2020-01-R: HCPCS | Performed by: FAMILY MEDICINE

## 2021-03-15 PROCEDURE — U0005 INFEC AGEN DETEC AMPLI PROBE: HCPCS | Performed by: FAMILY MEDICINE

## 2021-03-16 LAB — SARS-COV-2 RNA RESP QL NAA+PROBE: NOT DETECTED

## 2021-03-17 ENCOUNTER — NURSE TRIAGE (OUTPATIENT)
Dept: ADMINISTRATIVE | Facility: CLINIC | Age: 46
End: 2021-03-17

## 2021-03-18 ENCOUNTER — ANESTHESIA EVENT (OUTPATIENT)
Dept: ENDOSCOPY | Facility: HOSPITAL | Age: 46
End: 2021-03-18
Payer: COMMERCIAL

## 2021-03-18 ENCOUNTER — ANESTHESIA (OUTPATIENT)
Dept: ENDOSCOPY | Facility: HOSPITAL | Age: 46
End: 2021-03-18
Payer: COMMERCIAL

## 2021-03-18 ENCOUNTER — HOSPITAL ENCOUNTER (OUTPATIENT)
Facility: HOSPITAL | Age: 46
Discharge: HOME | End: 2021-03-18
Attending: INTERNAL MEDICINE | Admitting: INTERNAL MEDICINE
Payer: COMMERCIAL

## 2021-03-18 VITALS
BODY MASS INDEX: 27.49 KG/M2 | DIASTOLIC BLOOD PRESSURE: 84 MMHG | OXYGEN SATURATION: 96 % | RESPIRATION RATE: 15 BRPM | HEART RATE: 51 BPM | WEIGHT: 165 LBS | SYSTOLIC BLOOD PRESSURE: 144 MMHG | TEMPERATURE: 99 F | HEIGHT: 65 IN

## 2021-03-18 DIAGNOSIS — R10.9 ABDOMINAL PAIN: ICD-10-CM

## 2021-03-18 PROCEDURE — 43239 EGD BIOPSY SINGLE/MULTIPLE: CPT | Mod: ,,, | Performed by: INTERNAL MEDICINE

## 2021-03-18 PROCEDURE — 88305 TISSUE EXAM BY PATHOLOGIST: ICD-10-PCS | Mod: 26,,, | Performed by: PATHOLOGY

## 2021-03-18 PROCEDURE — 43239 PR EGD, FLEX, W/BIOPSY, SGL/MULTI: ICD-10-PCS | Mod: ,,, | Performed by: INTERNAL MEDICINE

## 2021-03-18 PROCEDURE — 43239 EGD BIOPSY SINGLE/MULTIPLE: CPT | Performed by: INTERNAL MEDICINE

## 2021-03-18 PROCEDURE — 63600175 PHARM REV CODE 636 W HCPCS: Performed by: NURSE ANESTHETIST, CERTIFIED REGISTERED

## 2021-03-18 PROCEDURE — 37000008 HC ANESTHESIA 1ST 15 MINUTES: Performed by: INTERNAL MEDICINE

## 2021-03-18 PROCEDURE — 25000003 PHARM REV CODE 250: Performed by: NURSE ANESTHETIST, CERTIFIED REGISTERED

## 2021-03-18 PROCEDURE — 25000003 PHARM REV CODE 250: Performed by: INTERNAL MEDICINE

## 2021-03-18 PROCEDURE — 37000009 HC ANESTHESIA EA ADD 15 MINS: Performed by: INTERNAL MEDICINE

## 2021-03-18 PROCEDURE — 88305 TISSUE EXAM BY PATHOLOGIST: CPT | Performed by: PATHOLOGY

## 2021-03-18 PROCEDURE — E9220 PRA ENDO ANESTHESIA: ICD-10-PCS | Mod: ,,, | Performed by: NURSE ANESTHETIST, CERTIFIED REGISTERED

## 2021-03-18 PROCEDURE — 88342 IMHCHEM/IMCYTCHM 1ST ANTB: CPT | Mod: 26,,, | Performed by: PATHOLOGY

## 2021-03-18 PROCEDURE — 88342 IMHCHEM/IMCYTCHM 1ST ANTB: CPT | Performed by: PATHOLOGY

## 2021-03-18 PROCEDURE — 88342 CHG IMMUNOCYTOCHEMISTRY: ICD-10-PCS | Mod: 26,,, | Performed by: PATHOLOGY

## 2021-03-18 PROCEDURE — 88305 TISSUE EXAM BY PATHOLOGIST: CPT | Mod: 26,,, | Performed by: PATHOLOGY

## 2021-03-18 PROCEDURE — 27201012 HC FORCEPS, HOT/COLD, DISP: Performed by: INTERNAL MEDICINE

## 2021-03-18 PROCEDURE — E9220 PRA ENDO ANESTHESIA: HCPCS | Mod: ,,, | Performed by: NURSE ANESTHETIST, CERTIFIED REGISTERED

## 2021-03-18 RX ORDER — PROPOFOL 10 MG/ML
VIAL (ML) INTRAVENOUS
Status: DISCONTINUED | OUTPATIENT
Start: 2021-03-18 | End: 2021-03-18

## 2021-03-18 RX ORDER — LIDOCAINE HYDROCHLORIDE 20 MG/ML
INJECTION INTRAVENOUS
Status: DISCONTINUED | OUTPATIENT
Start: 2021-03-18 | End: 2021-03-18

## 2021-03-18 RX ORDER — PROPOFOL 10 MG/ML
VIAL (ML) INTRAVENOUS CONTINUOUS PRN
Status: DISCONTINUED | OUTPATIENT
Start: 2021-03-18 | End: 2021-03-18

## 2021-03-18 RX ORDER — SODIUM CHLORIDE 9 MG/ML
INJECTION, SOLUTION INTRAVENOUS CONTINUOUS
Status: DISCONTINUED | OUTPATIENT
Start: 2021-03-18 | End: 2021-03-18 | Stop reason: HOSPADM

## 2021-03-18 RX ADMIN — PROPOFOL 75 MCG/KG/MIN: 10 INJECTION, EMULSION INTRAVENOUS at 11:03

## 2021-03-18 RX ADMIN — LIDOCAINE HYDROCHLORIDE 25 MG: 20 INJECTION, SOLUTION INTRAVENOUS at 11:03

## 2021-03-18 RX ADMIN — SODIUM CHLORIDE: 0.9 INJECTION, SOLUTION INTRAVENOUS at 10:03

## 2021-03-18 RX ADMIN — PROPOFOL 75 MG: 10 INJECTION, EMULSION INTRAVENOUS at 11:03

## 2021-03-24 ENCOUNTER — PATIENT MESSAGE (OUTPATIENT)
Dept: GASTROENTEROLOGY | Facility: CLINIC | Age: 46
End: 2021-03-24

## 2021-03-24 ENCOUNTER — TELEPHONE (OUTPATIENT)
Dept: GASTROENTEROLOGY | Facility: CLINIC | Age: 46
End: 2021-03-24

## 2021-03-24 DIAGNOSIS — A04.8 H. PYLORI INFECTION: Primary | ICD-10-CM

## 2021-03-24 LAB
FINAL PATHOLOGIC DIAGNOSIS: NORMAL
GROSS: NORMAL
Lab: NORMAL

## 2021-03-24 RX ORDER — PANTOPRAZOLE SODIUM 40 MG/1
40 TABLET, DELAYED RELEASE ORAL 2 TIMES DAILY
Qty: 28 TABLET | Refills: 0 | Status: SHIPPED | OUTPATIENT
Start: 2021-03-24 | End: 2023-05-02 | Stop reason: SDUPTHER

## 2021-03-24 RX ORDER — METRONIDAZOLE 500 MG/1
500 TABLET ORAL 2 TIMES DAILY
Qty: 28 TABLET | Refills: 0 | Status: SHIPPED | OUTPATIENT
Start: 2021-03-24 | End: 2021-04-07

## 2021-03-24 RX ORDER — AMOXICILLIN 500 MG/1
1000 CAPSULE ORAL EVERY 12 HOURS
Qty: 56 CAPSULE | Refills: 0 | Status: SHIPPED | OUTPATIENT
Start: 2021-03-24 | End: 2021-04-07

## 2021-03-24 RX ORDER — CLARITHROMYCIN 500 MG/1
500 TABLET, FILM COATED ORAL 2 TIMES DAILY
Qty: 28 TABLET | Refills: 0 | Status: SHIPPED | OUTPATIENT
Start: 2021-03-24 | End: 2021-04-07

## 2021-03-25 ENCOUNTER — TELEPHONE (OUTPATIENT)
Dept: HEPATOLOGY | Facility: CLINIC | Age: 46
End: 2021-03-25

## 2021-03-30 ENCOUNTER — TELEPHONE (OUTPATIENT)
Dept: HEPATOLOGY | Facility: CLINIC | Age: 46
End: 2021-03-30

## 2021-03-30 DIAGNOSIS — K76.9 CHRONIC LIVER DISEASE: ICD-10-CM

## 2021-03-30 DIAGNOSIS — K74.60 CIRRHOSIS OF LIVER WITHOUT ASCITES, UNSPECIFIED HEPATIC CIRRHOSIS TYPE: Primary | ICD-10-CM

## 2021-04-13 ENCOUNTER — TELEPHONE (OUTPATIENT)
Dept: GASTROENTEROLOGY | Facility: CLINIC | Age: 46
End: 2021-04-13

## 2021-04-22 DIAGNOSIS — Z01.818 PRE-PROCEDURAL EXAMINATION: Primary | ICD-10-CM

## 2021-04-23 RX ORDER — MIDAZOLAM HYDROCHLORIDE 1 MG/ML
1 INJECTION INTRAMUSCULAR; INTRAVENOUS
Status: CANCELLED | OUTPATIENT
Start: 2021-04-23

## 2021-04-23 RX ORDER — FENTANYL CITRATE 50 UG/ML
50 INJECTION, SOLUTION INTRAMUSCULAR; INTRAVENOUS
Status: CANCELLED | OUTPATIENT
Start: 2021-04-23

## 2021-04-27 ENCOUNTER — LAB VISIT (OUTPATIENT)
Dept: INTERNAL MEDICINE | Facility: CLINIC | Age: 46
End: 2021-04-27
Payer: COMMERCIAL

## 2021-04-27 ENCOUNTER — LAB VISIT (OUTPATIENT)
Dept: LAB | Facility: HOSPITAL | Age: 46
End: 2021-04-27
Attending: HOSPITALIST
Payer: COMMERCIAL

## 2021-04-27 DIAGNOSIS — Z01.818 PRE-PROCEDURAL EXAMINATION: ICD-10-CM

## 2021-04-27 DIAGNOSIS — K74.60 CIRRHOSIS OF LIVER WITHOUT ASCITES, UNSPECIFIED HEPATIC CIRRHOSIS TYPE: ICD-10-CM

## 2021-04-27 LAB
BASOPHILS # BLD AUTO: 0.07 K/UL (ref 0–0.2)
BASOPHILS NFR BLD: 1.2 % (ref 0–1.9)
DIFFERENTIAL METHOD: ABNORMAL
EOSINOPHIL # BLD AUTO: 0.3 K/UL (ref 0–0.5)
EOSINOPHIL NFR BLD: 4.3 % (ref 0–8)
ERYTHROCYTE [DISTWIDTH] IN BLOOD BY AUTOMATED COUNT: 14.8 % (ref 11.5–14.5)
HCT VFR BLD AUTO: 39.7 % (ref 37–48.5)
HGB BLD-MCNC: 13.2 G/DL (ref 12–16)
IMM GRANULOCYTES # BLD AUTO: 0.01 K/UL (ref 0–0.04)
IMM GRANULOCYTES NFR BLD AUTO: 0.2 % (ref 0–0.5)
INR PPP: 1 (ref 0.8–1.2)
LYMPHOCYTES # BLD AUTO: 2.1 K/UL (ref 1–4.8)
LYMPHOCYTES NFR BLD: 36.3 % (ref 18–48)
MCH RBC QN AUTO: 28 PG (ref 27–31)
MCHC RBC AUTO-ENTMCNC: 33.2 G/DL (ref 32–36)
MCV RBC AUTO: 84 FL (ref 82–98)
MONOCYTES # BLD AUTO: 0.9 K/UL (ref 0.3–1)
MONOCYTES NFR BLD: 15 % (ref 4–15)
NEUTROPHILS # BLD AUTO: 2.5 K/UL (ref 1.8–7.7)
NEUTROPHILS NFR BLD: 43 % (ref 38–73)
NRBC BLD-RTO: 0 /100 WBC
PLATELET # BLD AUTO: 274 K/UL (ref 150–450)
PLATELET BLD QL SMEAR: ABNORMAL
PMV BLD AUTO: 11.7 FL (ref 9.2–12.9)
PROTHROMBIN TIME: 10.8 SEC (ref 9–12.5)
RBC # BLD AUTO: 4.72 M/UL (ref 4–5.4)
WBC # BLD AUTO: 5.86 K/UL (ref 3.9–12.7)

## 2021-04-27 PROCEDURE — U0005 INFEC AGEN DETEC AMPLI PROBE: HCPCS | Performed by: PHYSICIAN ASSISTANT

## 2021-04-27 PROCEDURE — 85610 PROTHROMBIN TIME: CPT | Performed by: FAMILY MEDICINE

## 2021-04-27 PROCEDURE — 36415 COLL VENOUS BLD VENIPUNCTURE: CPT | Performed by: FAMILY MEDICINE

## 2021-04-27 PROCEDURE — U0003 INFECTIOUS AGENT DETECTION BY NUCLEIC ACID (DNA OR RNA); SEVERE ACUTE RESPIRATORY SYNDROME CORONAVIRUS 2 (SARS-COV-2) (CORONAVIRUS DISEASE [COVID-19]), AMPLIFIED PROBE TECHNIQUE, MAKING USE OF HIGH THROUGHPUT TECHNOLOGIES AS DESCRIBED BY CMS-2020-01-R: HCPCS | Performed by: PHYSICIAN ASSISTANT

## 2021-04-27 PROCEDURE — 85025 COMPLETE CBC W/AUTO DIFF WBC: CPT | Performed by: FAMILY MEDICINE

## 2021-04-28 ENCOUNTER — DOCUMENTATION ONLY (OUTPATIENT)
Dept: PREADMISSION TESTING | Facility: HOSPITAL | Age: 46
End: 2021-04-28

## 2021-04-28 LAB — SARS-COV-2 RNA RESP QL NAA+PROBE: NOT DETECTED

## 2021-04-30 ENCOUNTER — ANESTHESIA (OUTPATIENT)
Dept: INTERVENTIONAL RADIOLOGY/VASCULAR | Facility: HOSPITAL | Age: 46
End: 2021-04-30
Payer: COMMERCIAL

## 2021-04-30 ENCOUNTER — HOSPITAL ENCOUNTER (OUTPATIENT)
Dept: INTERVENTIONAL RADIOLOGY/VASCULAR | Facility: HOSPITAL | Age: 46
Discharge: HOME | End: 2021-04-30
Attending: INTERNAL MEDICINE
Payer: COMMERCIAL

## 2021-04-30 VITALS
RESPIRATION RATE: 16 BRPM | OXYGEN SATURATION: 97 % | HEIGHT: 65 IN | SYSTOLIC BLOOD PRESSURE: 105 MMHG | DIASTOLIC BLOOD PRESSURE: 73 MMHG | HEART RATE: 67 BPM | WEIGHT: 165 LBS | TEMPERATURE: 98 F | BODY MASS INDEX: 27.49 KG/M2

## 2021-04-30 DIAGNOSIS — K74.60 CIRRHOSIS OF LIVER WITHOUT ASCITES, UNSPECIFIED HEPATIC CIRRHOSIS TYPE: ICD-10-CM

## 2021-04-30 DIAGNOSIS — K76.9 CHRONIC LIVER DISEASE: ICD-10-CM

## 2021-04-30 PROCEDURE — 63600175 PHARM REV CODE 636 W HCPCS: Performed by: NURSE ANESTHETIST, CERTIFIED REGISTERED

## 2021-04-30 PROCEDURE — 88313 PR  SPECIAL STAINS,GROUP II: ICD-10-PCS | Mod: 26,,, | Performed by: PATHOLOGY

## 2021-04-30 PROCEDURE — 88313 SPECIAL STAINS GROUP 2: CPT | Mod: 26,,, | Performed by: PATHOLOGY

## 2021-04-30 PROCEDURE — 88342 IMHCHEM/IMCYTCHM 1ST ANTB: CPT | Mod: 26,,, | Performed by: PATHOLOGY

## 2021-04-30 PROCEDURE — 88342 IMHCHEM/IMCYTCHM 1ST ANTB: CPT | Performed by: PATHOLOGY

## 2021-04-30 PROCEDURE — 88307 PR  SURG PATH,LEVEL V: ICD-10-PCS | Mod: 26,,, | Performed by: PATHOLOGY

## 2021-04-30 PROCEDURE — 76942 ECHO GUIDE FOR BIOPSY: CPT | Mod: 26,,, | Performed by: STUDENT IN AN ORGANIZED HEALTH CARE EDUCATION/TRAINING PROGRAM

## 2021-04-30 PROCEDURE — 88342 CHG IMMUNOCYTOCHEMISTRY: ICD-10-PCS | Mod: 26,,, | Performed by: PATHOLOGY

## 2021-04-30 PROCEDURE — 27201068 IR BIOPSY LIVER

## 2021-04-30 PROCEDURE — 25000003 PHARM REV CODE 250: Performed by: FAMILY MEDICINE

## 2021-04-30 PROCEDURE — 37000009 HC ANESTHESIA EA ADD 15 MINS

## 2021-04-30 PROCEDURE — 27000221 HC OXYGEN, UP TO 24 HOURS

## 2021-04-30 PROCEDURE — D9220A PRA ANESTHESIA: ICD-10-PCS | Mod: ,,, | Performed by: STUDENT IN AN ORGANIZED HEALTH CARE EDUCATION/TRAINING PROGRAM

## 2021-04-30 PROCEDURE — 37000008 HC ANESTHESIA 1ST 15 MINUTES

## 2021-04-30 PROCEDURE — 76942 IR BIOPSY LIVER: ICD-10-PCS | Mod: 26,,, | Performed by: STUDENT IN AN ORGANIZED HEALTH CARE EDUCATION/TRAINING PROGRAM

## 2021-04-30 PROCEDURE — 25000003 PHARM REV CODE 250: Performed by: NURSE ANESTHETIST, CERTIFIED REGISTERED

## 2021-04-30 PROCEDURE — 47000 NEEDLE BIOPSY OF LIVER PERQ: CPT | Performed by: STUDENT IN AN ORGANIZED HEALTH CARE EDUCATION/TRAINING PROGRAM

## 2021-04-30 PROCEDURE — 94761 N-INVAS EAR/PLS OXIMETRY MLT: CPT

## 2021-04-30 PROCEDURE — 47000 IR BIOPSY LIVER: ICD-10-PCS | Mod: ,,, | Performed by: STUDENT IN AN ORGANIZED HEALTH CARE EDUCATION/TRAINING PROGRAM

## 2021-04-30 PROCEDURE — 88313 SPECIAL STAINS GROUP 2: CPT | Performed by: PATHOLOGY

## 2021-04-30 PROCEDURE — 88307 TISSUE EXAM BY PATHOLOGIST: CPT | Mod: 26,,, | Performed by: PATHOLOGY

## 2021-04-30 PROCEDURE — D9220A PRA ANESTHESIA: Mod: ,,, | Performed by: STUDENT IN AN ORGANIZED HEALTH CARE EDUCATION/TRAINING PROGRAM

## 2021-04-30 PROCEDURE — 88307 TISSUE EXAM BY PATHOLOGIST: CPT | Performed by: PATHOLOGY

## 2021-04-30 RX ORDER — IBUPROFEN 800 MG/1
800 TABLET ORAL 3 TIMES DAILY
COMMUNITY
End: 2023-11-22 | Stop reason: ALTCHOICE

## 2021-04-30 RX ORDER — FENTANYL CITRATE 50 UG/ML
25 INJECTION, SOLUTION INTRAMUSCULAR; INTRAVENOUS EVERY 5 MIN PRN
Status: DISCONTINUED | OUTPATIENT
Start: 2021-04-30 | End: 2021-05-01 | Stop reason: HOSPADM

## 2021-04-30 RX ORDER — PROPOFOL 10 MG/ML
VIAL (ML) INTRAVENOUS CONTINUOUS PRN
Status: DISCONTINUED | OUTPATIENT
Start: 2021-04-30 | End: 2021-04-30

## 2021-04-30 RX ORDER — SODIUM CHLORIDE 9 MG/ML
INJECTION, SOLUTION INTRAVENOUS ONCE
Status: COMPLETED | OUTPATIENT
Start: 2021-04-30 | End: 2021-04-30

## 2021-04-30 RX ORDER — SODIUM CHLORIDE 0.9 % (FLUSH) 0.9 %
3 SYRINGE (ML) INJECTION EVERY 4 HOURS PRN
Status: DISCONTINUED | OUTPATIENT
Start: 2021-04-30 | End: 2021-05-01 | Stop reason: HOSPADM

## 2021-04-30 RX ORDER — MIDAZOLAM HYDROCHLORIDE 1 MG/ML
INJECTION, SOLUTION INTRAMUSCULAR; INTRAVENOUS
Status: DISCONTINUED | OUTPATIENT
Start: 2021-04-30 | End: 2021-04-30

## 2021-04-30 RX ORDER — FENTANYL CITRATE 50 UG/ML
INJECTION, SOLUTION INTRAMUSCULAR; INTRAVENOUS
Status: DISCONTINUED | OUTPATIENT
Start: 2021-04-30 | End: 2021-04-30

## 2021-04-30 RX ORDER — PROPOFOL 10 MG/ML
VIAL (ML) INTRAVENOUS
Status: DISCONTINUED | OUTPATIENT
Start: 2021-04-30 | End: 2021-04-30

## 2021-04-30 RX ORDER — HALOPERIDOL 5 MG/ML
0.5 INJECTION INTRAMUSCULAR EVERY 10 MIN PRN
Status: DISCONTINUED | OUTPATIENT
Start: 2021-04-30 | End: 2021-05-01 | Stop reason: HOSPADM

## 2021-04-30 RX ADMIN — MIDAZOLAM HYDROCHLORIDE 2 MG: 1 INJECTION, SOLUTION INTRAMUSCULAR; INTRAVENOUS at 11:04

## 2021-04-30 RX ADMIN — FENTANYL CITRATE 50 MCG: 50 INJECTION, SOLUTION INTRAMUSCULAR; INTRAVENOUS at 11:04

## 2021-04-30 RX ADMIN — SODIUM CHLORIDE: 0.9 INJECTION, SOLUTION INTRAVENOUS at 10:04

## 2021-04-30 RX ADMIN — PROPOFOL 30 MG: 10 INJECTION, EMULSION INTRAVENOUS at 11:04

## 2021-04-30 RX ADMIN — PROPOFOL 150 MCG/KG/MIN: 10 INJECTION, EMULSION INTRAVENOUS at 11:04

## 2021-04-30 RX ADMIN — SODIUM CHLORIDE: 0.9 INJECTION, SOLUTION INTRAVENOUS at 11:04

## 2021-04-30 RX ADMIN — PROPOFOL 40 MG: 10 INJECTION, EMULSION INTRAVENOUS at 11:04

## 2021-04-30 RX ADMIN — GLYCOPYRROLATE 0.2 MG: 0.2 INJECTION, SOLUTION INTRAMUSCULAR; INTRAVITREAL at 11:04

## 2021-05-07 LAB
FINAL PATHOLOGIC DIAGNOSIS: NORMAL
GROSS: NORMAL
Lab: NORMAL

## 2021-05-10 ENCOUNTER — TELEPHONE (OUTPATIENT)
Dept: HEPATOLOGY | Facility: CLINIC | Age: 46
End: 2021-05-10

## 2021-05-11 ENCOUNTER — PATIENT MESSAGE (OUTPATIENT)
Dept: HEPATOLOGY | Facility: CLINIC | Age: 46
End: 2021-05-11

## 2021-05-11 ENCOUNTER — OFFICE VISIT (OUTPATIENT)
Dept: HEPATOLOGY | Facility: CLINIC | Age: 46
End: 2021-05-11
Payer: COMMERCIAL

## 2021-05-11 ENCOUNTER — LAB VISIT (OUTPATIENT)
Dept: LAB | Facility: HOSPITAL | Age: 46
End: 2021-05-11
Attending: INTERNAL MEDICINE
Payer: COMMERCIAL

## 2021-05-11 VITALS
HEIGHT: 65 IN | BODY MASS INDEX: 28.36 KG/M2 | HEART RATE: 75 BPM | RESPIRATION RATE: 20 BRPM | DIASTOLIC BLOOD PRESSURE: 69 MMHG | OXYGEN SATURATION: 99 % | SYSTOLIC BLOOD PRESSURE: 118 MMHG | WEIGHT: 170.19 LBS

## 2021-05-11 DIAGNOSIS — K74.00 LIVER FIBROSIS: ICD-10-CM

## 2021-05-11 DIAGNOSIS — R74.8 ELEVATED LIVER ENZYMES: ICD-10-CM

## 2021-05-11 DIAGNOSIS — K76.9 CHRONIC LIVER DISEASE: Primary | ICD-10-CM

## 2021-05-11 DIAGNOSIS — K76.9 CHRONIC LIVER DISEASE: ICD-10-CM

## 2021-05-11 LAB
ALBUMIN SERPL BCP-MCNC: 3.3 G/DL (ref 3.5–5.2)
ALP SERPL-CCNC: 623 U/L (ref 55–135)
ALT SERPL W/O P-5'-P-CCNC: 77 U/L (ref 10–44)
ANION GAP SERPL CALC-SCNC: 7 MMOL/L (ref 8–16)
AST SERPL-CCNC: 61 U/L (ref 10–40)
BASOPHILS # BLD AUTO: 0.06 K/UL (ref 0–0.2)
BASOPHILS NFR BLD: 1.2 % (ref 0–1.9)
BILIRUB SERPL-MCNC: 1.1 MG/DL (ref 0.1–1)
BUN SERPL-MCNC: 10 MG/DL (ref 6–20)
CALCIUM SERPL-MCNC: 9.2 MG/DL (ref 8.7–10.5)
CHLORIDE SERPL-SCNC: 107 MMOL/L (ref 95–110)
CO2 SERPL-SCNC: 24 MMOL/L (ref 23–29)
CREAT SERPL-MCNC: 0.7 MG/DL (ref 0.5–1.4)
DIFFERENTIAL METHOD: NORMAL
EOSINOPHIL # BLD AUTO: 0.4 K/UL (ref 0–0.5)
EOSINOPHIL NFR BLD: 8 % (ref 0–8)
ERYTHROCYTE [DISTWIDTH] IN BLOOD BY AUTOMATED COUNT: 14.5 % (ref 11.5–14.5)
EST. GFR  (AFRICAN AMERICAN): >60 ML/MIN/1.73 M^2
EST. GFR  (NON AFRICAN AMERICAN): >60 ML/MIN/1.73 M^2
GLUCOSE SERPL-MCNC: 73 MG/DL (ref 70–110)
HCT VFR BLD AUTO: 39.3 % (ref 37–48.5)
HGB BLD-MCNC: 13.2 G/DL (ref 12–16)
IMM GRANULOCYTES # BLD AUTO: 0.01 K/UL (ref 0–0.04)
IMM GRANULOCYTES NFR BLD AUTO: 0.2 % (ref 0–0.5)
INR PPP: 1 (ref 0.8–1.2)
LYMPHOCYTES # BLD AUTO: 1.7 K/UL (ref 1–4.8)
LYMPHOCYTES NFR BLD: 32.9 % (ref 18–48)
MCH RBC QN AUTO: 27.9 PG (ref 27–31)
MCHC RBC AUTO-ENTMCNC: 33.6 G/DL (ref 32–36)
MCV RBC AUTO: 83 FL (ref 82–98)
MONOCYTES # BLD AUTO: 0.6 K/UL (ref 0.3–1)
MONOCYTES NFR BLD: 12.8 % (ref 4–15)
NEUTROPHILS # BLD AUTO: 2.3 K/UL (ref 1.8–7.7)
NEUTROPHILS NFR BLD: 44.9 % (ref 38–73)
NRBC BLD-RTO: 0 /100 WBC
PLATELET # BLD AUTO: 290 K/UL (ref 150–450)
PMV BLD AUTO: 10.8 FL (ref 9.2–12.9)
POTASSIUM SERPL-SCNC: 4.3 MMOL/L (ref 3.5–5.1)
PROT SERPL-MCNC: 8.1 G/DL (ref 6–8.4)
PROTHROMBIN TIME: 10.9 SEC (ref 9–12.5)
RBC # BLD AUTO: 4.73 M/UL (ref 4–5.4)
SODIUM SERPL-SCNC: 138 MMOL/L (ref 136–145)
WBC # BLD AUTO: 5.01 K/UL (ref 3.9–12.7)

## 2021-05-11 PROCEDURE — 85610 PROTHROMBIN TIME: CPT | Performed by: INTERNAL MEDICINE

## 2021-05-11 PROCEDURE — 36415 COLL VENOUS BLD VENIPUNCTURE: CPT | Performed by: INTERNAL MEDICINE

## 2021-05-11 PROCEDURE — 85025 COMPLETE CBC W/AUTO DIFF WBC: CPT | Performed by: INTERNAL MEDICINE

## 2021-05-11 PROCEDURE — 99213 OFFICE O/P EST LOW 20 MIN: CPT | Mod: S$GLB,,, | Performed by: INTERNAL MEDICINE

## 2021-05-11 PROCEDURE — 82164 ANGIOTENSIN I ENZYME TEST: CPT | Performed by: INTERNAL MEDICINE

## 2021-05-11 PROCEDURE — 80053 COMPREHEN METABOLIC PANEL: CPT | Performed by: INTERNAL MEDICINE

## 2021-05-11 PROCEDURE — 99213 PR OFFICE/OUTPT VISIT, EST, LEVL III, 20-29 MIN: ICD-10-PCS | Mod: S$GLB,,, | Performed by: INTERNAL MEDICINE

## 2021-05-11 PROCEDURE — 99999 PR PBB SHADOW E&M-EST. PATIENT-LVL III: ICD-10-PCS | Mod: PBBFAC,,, | Performed by: INTERNAL MEDICINE

## 2021-05-11 PROCEDURE — 99999 PR PBB SHADOW E&M-EST. PATIENT-LVL III: CPT | Mod: PBBFAC,,, | Performed by: INTERNAL MEDICINE

## 2021-05-12 LAB — ACE SERPL-CCNC: 68 U/L (ref 16–85)

## 2021-05-13 ENCOUNTER — TELEPHONE (OUTPATIENT)
Dept: HEPATOLOGY | Facility: CLINIC | Age: 46
End: 2021-05-13

## 2021-05-13 ENCOUNTER — CONFERENCE (OUTPATIENT)
Dept: TRANSPLANT | Facility: CLINIC | Age: 46
End: 2021-05-13

## 2021-05-13 DIAGNOSIS — R79.89 ELEVATED LIVER FUNCTION TESTS: Primary | ICD-10-CM

## 2021-05-13 RX ORDER — PREDNISONE 10 MG/1
10 TABLET ORAL DAILY
Qty: 30 TABLET | Refills: 6 | Status: SHIPPED | OUTPATIENT
Start: 2021-05-13

## 2021-05-27 ENCOUNTER — TELEPHONE (OUTPATIENT)
Dept: RHEUMATOLOGY | Facility: CLINIC | Age: 46
End: 2021-05-27

## 2022-04-20 ENCOUNTER — TELEPHONE (OUTPATIENT)
Dept: HEPATOLOGY | Facility: CLINIC | Age: 47
End: 2022-04-20
Payer: COMMERCIAL

## 2022-04-20 DIAGNOSIS — I81 PORTAL VEIN THROMBOSIS: Primary | ICD-10-CM

## 2022-04-20 NOTE — TELEPHONE ENCOUNTER
----- Message from Ibrahima Lyman MD sent at 4/20/2022 12:34 PM CDT -----  Regarding: RE: Referral  Ordered    ----- Message -----  From: Jeevan Acuna MA  Sent: 4/20/2022  12:11 PM CDT  To: Ibrahima Lyman MD  Subject: FW: Referral                                     Hey,    Can you please place testing pt will need prior to following up ?    Thanks,  Jeevan   ----- Message -----  From: Anna Vega  Sent: 4/20/2022  12:08 PM CDT  To: Esmer Alexandra V Staff  Subject: Referral                                         Good Afternoon,    Dr. Shae Yates would like to refer the following patient to the Hepatology department. The patients diagnosis is cirrhosis of liver. I have scanned the patients referral and records into .     Please let me know if I can help schedule in any way.     Thank you,   [facility]

## 2022-04-26 ENCOUNTER — TELEPHONE (OUTPATIENT)
Dept: HEPATOLOGY | Facility: CLINIC | Age: 47
End: 2022-04-26
Payer: COMMERCIAL

## 2022-04-27 ENCOUNTER — TELEPHONE (OUTPATIENT)
Dept: HEPATOLOGY | Facility: CLINIC | Age: 47
End: 2022-04-27
Payer: COMMERCIAL

## 2022-04-27 NOTE — TELEPHONE ENCOUNTER
----- Message from Vandana Thomson MA sent at 4/26/2022  4:42 PM CDT -----  Regarding: FW: return call    ----- Message -----  From: Kevin Bobby  Sent: 4/26/2022   4:13 PM CDT  To: Esmer Alexandra V Staff  Subject: return call                                      Patient returning call to MA. Patient states she could not find Rx bottle with 's name on it.      Call: [phone] (Mobile

## 2023-04-30 PROBLEM — K74.00 LIVER FIBROSIS: Status: ACTIVE | Noted: 2023-04-30

## 2023-04-30 PROBLEM — R74.8 ELEVATED LIVER ENZYMES: Status: ACTIVE | Noted: 2023-04-30

## 2023-04-30 PROBLEM — K76.9 CHRONIC LIVER DISEASE: Status: ACTIVE | Noted: 2023-04-30

## 2023-04-30 PROBLEM — R79.89 ELEVATED LIVER FUNCTION TESTS: Status: ACTIVE | Noted: 2023-04-30

## 2023-04-30 NOTE — PROGRESS NOTES
Hepatology Note    PATIENT: Melany Hackett  MRN: [id number]  DATE: 5/2/2023    Provider: Hepatologist - Dr Lyman  Urgency of review: non-urgent  Referring provider: No ref. provider found    Diagnosis:   1. Elevated liver enzymes    2. Elevated liver function tests    3. Chronic liver disease    4. Liver fibrosis    5. Gastroesophageal reflux disease without esophagitis          Chief complaint:   Chief Complaint   Patient presents with    Hepatic Disease    Elevated Hepatic Enzymes       Subjective:    Initial History: Melany Hackett is a 47 y.o. female who was following to Hepatology Clinic for granulomatous liver disease    05/02/2023  Acid reflux symptoms  No signs of hepatic decompensation  Patient has done labs 2 weeks back at PCP. Plan to review those records  No New complains from liver standpoint.      5/11/2021   AST/ALT 48/50 with ALK PO4 456  Patient had liver biopsy which showed  - Numerous coalescing well-formed portal and lobular granulomas, Mild zone 3 sinusoidal dilatation, Fibrous expansion of portal tracts and fibrosis surrounding granulomas. stage 1 (of 4).   - Patchy duct loss with ductular reaction  No fever, weight loss, respiratory or cardiac complains    3/4/21  No complains from liver standpoint.   C/o Nausea and intermittent vomiting with abdominal pain. On PPI   MRI ? cirrhosis   The patient reports no symptoms of hepatitis including malaise or flu-like symptoms to suggest a flare.The patient reports no fevers/chills or pruritis to suggest biliary disease. She denies recent hematemesis, coffee ground emesis, melena, hematochezia, jaundice, confusion, LE edema, abdominal distension.      Patient has no hx of liver disease. Has no family hx of liver disease. No hx of autoimmune disease. Denies alcohol use    Autoimmune markers were negative except sm borderline 1:40 and IgG 2410. Viral markers negative    She has never received a blood transfusion and has no tattoos.  She denies history of IV  drug use/Tatto  She has no known family history of liver disease.   She  denies high-risk sexual contacts, no raw seafood, no sick contacts, no hepatotoxic medication    Prior Relevant History:  10/19/18:  Dr. Mayes performed laparoscopic cholecystectomy.   He also saw liver mass and 2 liver biopsies done.     FINAL PATHOLOGIC DIAGNOSIS  Part 1  Hepatic parenchyma (wedge biopsy):  -Extensive granulomatous inflammation with minimal fibrinoid necrosis  -Minimal macrovesicular fatty degeneration  -No malignancy is identified  -Special stains for acid-fast bacilli and fungi will be performed     Part 2  Gallbladder:  -Mild chronic cholecystitis     Part 3  Liver (wedge excision):  -Extensive granulomatous inflammation and fibrosis exhibiting minimal fibrinoid necrosis  -Minimal macrovesicular fatty degeneration  -No malignancy is identified     -Special stains for acid-fast bacilli and fungi are being performed on part 1.  Microscopic Examination  Part 1  Represented are sections of hepatic parenchyma with overlying capsule showing extensive fibrosis consisting of  irregular streaming bands of moderately cellular fibrous tissue coursing among distorted but otherwise  normal-appearing hepatic parenchyma. Within these fibrous areas are numerous granulomas consisting of  aggregates of epithelioid macrophages, scattered lymphocytes, and some giant cells, predominantly foreign-body type.  Some of these areas of blood small blood vessels are present centrally, although vasculitis is not appreciated. A small number of neutrophils is also identified within inflammatory infiltrate. These fibrotic foci are well demarcated from adjacent hepatic parenchyma, which exhibits a normal cord pattern. Hepatocytes show focal macrovesicular fatty degeneration but are free of other cytoplasmic or nuclear inclusions. Sinusoids are relatively well demarcated while Kupffer cells appear slightly increased in number. No hepatocellular necrosis  is definitively identified. No malignancy is identified in multiple levels representing the entirety of the specimen. At its periphery, cautery artifact is present. Some of the granulomas show a peripheral layer of fibroblastic and collagenous tissue imparting an onionskin appearance. Minimal foci centrally located fibrinous material suggestive of fibrinoid necrosis are noted. No caseation is seen.     Part 2  Sections of gallbladder show a mild to moderate lymphoplasmacytic inflammatory infiltrate present within the lamina  propria and extending to deep serosa. RA sinuses are present. No acute inflammatory component is identified.     Part 3  Represented are multiple sections of hepatic parenchyma showing moderate replacement by broad, irregularly shaped stones of fibrous tissue within which are granulomas. Findings within this specimen are essentially identical to those described within part 1, above. Hepatocytes, however, exhibit areas of some obscuring of the cord pattern combined with areas of sinusoidal dilatation without significant congestion. No malignancy is identified. Slight bile ductular proliferation is noted within some fibrotic areas, as are occasional foreign body type giant cells. Prominent cautery artifact is present at the periphery.    Review of systems:  A review of 12+ systems was conducted with pertinent positive and negative findings documented in HPI with all other systems reviewed and negative.    Past Medical History:   Past Medical History:   Diagnosis Date    Allergy     seasonal    Fatty liver     Gastroesophageal reflux disease without esophagitis 5/2/2023    Headache     Hyperlipemia     Sickle cell trait     Thyroid disease        Past Surgical HIstory:   Past Surgical History:   Procedure Laterality Date    CHOLECYSTECTOMY      COLONOSCOPY      unsure, thinks may have had it     ESOPHAGOGASTRODUODENOSCOPY N/A 3/18/2021    Procedure: EGD (ESOPHAGOGASTRODUODENOSCOPY);  Surgeon:  Chaim Vizcaino MD;  Location: [facility] ENDO (4TH FLR);  Service: Endoscopy;  Laterality: N/A;  cirrhosis, variceal screening- last labs 3/5/21- ERW  prep ins. emailed - COVID screening PCW 3/15/21- ERW    LAPAROSCOPIC BIOPSY OF LIVER N/A 10/19/2018    Procedure: BIOPSY, LIVER, LAPAROSCOPIC;  Surgeon: Lucien Mayes MD;  Location: [facility] OR;  Service: General;  Laterality: N/A;  RN PREOP 10/12/2018-----NEED H/P    LAPAROSCOPIC CHOLECYSTECTOMY  10/19/2018    Procedure: CHOLECYSTECTOMY, LAPAROSCOPIC;  Surgeon: Lucien Mayes MD;  Location: [facility] OR;  Service: General;;    TUBAL LIGATION      UTERINE FIBROID SURGERY         Family History:   Family History   Problem Relation Age of Onset    Hypertension Mother     Hyperlipidemia Mother     Heart disease Father     Kidney disease Father     Heart disease Sister     Breast cancer Maternal Aunt     Hypertension Maternal Aunt     Clotting disorder Maternal Aunt     Anxiety disorder Maternal Aunt     Heart attack Maternal Aunt     Diabetes Sister     Hyperlipidemia Sister     Hypertension Sister        Social History:  reports that she has been smoking cigarettes. She has a 6.00 pack-year smoking history. She has never used smokeless tobacco. She reports that she does not currently use alcohol. She reports that she does not use drugs.    PFSH:  Past medical, family, and social history reviewed as documented in chart with pertinent positive medical, family, and social history detailed in HPI.    Allergies:  Review of patient's allergies indicates:   Allergen Reactions    Nicholas Itching     Black Beans       Medications:  Current Outpatient Medications   Medication Sig Dispense Refill    albuterol (PROVENTIL/VENTOLIN HFA) 90 mcg/actuation inhaler Inhale into the lungs daily as needed.       atorvastatin (LIPITOR) 20 MG tablet Take 20 mg by mouth.      ergocalciferol (ERGOCALCIFEROL) 50,000 unit Cap Take 50,000 Units by mouth 3 (three) times a week.      ibuprofen  (ADVIL,MOTRIN) 800 MG tablet Take 800 mg by mouth 3 (three) times daily. As needed      pantoprazole (PROTONIX) 40 MG tablet Take 1 tablet (40 mg total) by mouth once daily. 30 tablet 11    pantoprazole (PROTONIX) 40 MG tablet Take 1 tablet (40 mg total) by mouth 2 (two) times daily. for 14 days 28 tablet 0    predniSONE (DELTASONE) 10 MG tablet Take 1 tablet (10 mg total) by mouth once daily. 30 tablet 6    simethicone (GAS-X ORAL) Take by mouth.       No current facility-administered medications for this visit.       Review of Systems   Constitutional:  Negative for fatigue, fever and unexpected weight change.   HENT:  Negative for ear pain, nosebleeds and trouble swallowing.    Eyes:  Negative for discharge and redness.   Respiratory:  Negative for cough and shortness of breath.    Cardiovascular:  Negative for palpitations and leg swelling.   Gastrointestinal:  Negative for abdominal distention, abdominal pain, diarrhea and vomiting.   Endocrine: Negative for cold intolerance and polyuria.   Genitourinary:  Negative for flank pain and hematuria.   Musculoskeletal:  Negative for back pain.   Skin:  Negative for pallor.   Neurological:  Negative for seizures and headaches.   Hematological:  Does not bruise/bleed easily.   Psychiatric/Behavioral:  Negative for confusion and hallucinations.      ECOG Performance Status: 0       Objective:      Vitals:   There were no vitals filed for this visit.      Physical Exam  Constitutional:       Appearance: Normal appearance.   HENT:      Head: Normocephalic and atraumatic.      Right Ear: External ear normal.      Left Ear: External ear normal.      Mouth/Throat:      Mouth: Mucous membranes are moist.   Eyes:      Extraocular Movements: Extraocular movements intact.      Pupils: Pupils are equal, round, and reactive to light.   Cardiovascular:      Rate and Rhythm: Normal rate and regular rhythm.      Pulses: Normal pulses.      Heart sounds: Normal heart sounds.    Pulmonary:      Effort: Pulmonary effort is normal.      Breath sounds: Normal breath sounds.   Abdominal:      General: Bowel sounds are normal. There is no distension.      Palpations: Abdomen is soft. There is no mass.      Tenderness: There is no abdominal tenderness.   Musculoskeletal:         General: Normal range of motion.      Cervical back: Normal range of motion and neck supple.   Skin:     General: Skin is warm.   Neurological:      General: No focal deficit present.      Mental Status: She is alert and oriented to person, place, and time.       Laboratory Data:  No visits with results within 1 Week(s) from this visit.   Latest known visit with results is:   Lab Visit on 05/11/2021   Component Date Value Ref Range Status    Angio Convert Enzyme 05/11/2021 68  16 - 85 U/L Final    Comment: Test Performed by:  [place] [address]  : Mendel Ricardo M.D. Ph.D.; CLIA# [id number]      WBC 05/11/2021 5.01  3.90 - 12.70 K/uL Final    RBC 05/11/2021 4.73  4.00 - 5.40 M/uL Final    Hemoglobin 05/11/2021 13.2  12.0 - 16.0 g/dL Final    Hematocrit 05/11/2021 39.3  37.0 - 48.5 % Final    MCV 05/11/2021 83  82 - 98 fL Final    MCH 05/11/2021 27.9  27.0 - 31.0 pg Final    MCHC 05/11/2021 33.6  32.0 - 36.0 g/dL Final    RDW 05/11/2021 14.5  11.5 - 14.5 % Final    Platelets 05/11/2021 290  150 - 450 K/uL Final    MPV 05/11/2021 10.8  9.2 - 12.9 fL Final    Immature Granulocytes 05/11/2021 0.2  0.0 - 0.5 % Final    Gran # (ANC) 05/11/2021 2.3  1.8 - 7.7 K/uL Final    Immature Grans (Abs) 05/11/2021 0.01  0.00 - 0.04 K/uL Final    Comment: Mild elevation in immature granulocytes is non specific and   can be seen in a variety of conditions including stress response,   acute inflammation, trauma and pregnancy. Correlation with other   laboratory and clinical findings is essential.      Lymph # 05/11/2021 1.7  1.0 - 4.8 K/uL Final    Mono  # 05/11/2021 0.6  0.3 - 1.0 K/uL Final    Eos # 05/11/2021 0.4  0.0 - 0.5 K/uL Final    Baso # 05/11/2021 0.06  0.00 - 0.20 K/uL Final    nRBC 05/11/2021 0  0 /100 WBC Final    Gran % 05/11/2021 44.9  38.0 - 73.0 % Final    Lymph % 05/11/2021 32.9  18.0 - 48.0 % Final    Mono % 05/11/2021 12.8  4.0 - 15.0 % Final    Eosinophil % 05/11/2021 8.0  0.0 - 8.0 % Final    Basophil % 05/11/2021 1.2  0.0 - 1.9 % Final    Differential Method 05/11/2021 Automated   Final    Sodium 05/11/2021 138  136 - 145 mmol/L Final    Potassium 05/11/2021 4.3  3.5 - 5.1 mmol/L Final    Chloride 05/11/2021 107  95 - 110 mmol/L Final    CO2 05/11/2021 24  23 - 29 mmol/L Final    Glucose 05/11/2021 73  70 - 110 mg/dL Final    BUN 05/11/2021 10  6 - 20 mg/dL Final    Creatinine 05/11/2021 0.7  0.5 - 1.4 mg/dL Final    Calcium 05/11/2021 9.2  8.7 - 10.5 mg/dL Final    Total Protein 05/11/2021 8.1  6.0 - 8.4 g/dL Final    Albumin 05/11/2021 3.3 (L)  3.5 - 5.2 g/dL Final    Total Bilirubin 05/11/2021 1.1 (H)  0.1 - 1.0 mg/dL Final    Comment: For infants and newborns, interpretation of results should be based  on gestational age, weight and in agreement with clinical  observations.    Premature Infant recommended reference ranges:  Up to 24 hours.............<8.0 mg/dL  Up to 48 hours............<12.0 mg/dL  3-5 days..................<15.0 mg/dL  6-29 days.................<15.0 mg/dL      Alkaline Phosphatase 05/11/2021 623 (H)  55 - 135 U/L Final    AST 05/11/2021 61 (H)  10 - 40 U/L Final    ALT 05/11/2021 77 (H)  10 - 44 U/L Final    Anion Gap 05/11/2021 7 (L)  8 - 16 mmol/L Final    eGFR if African American 05/11/2021 >60.0  >60 mL/min/1.73 m^2 Final    eGFR if non African American 05/11/2021 >60.0  >60 mL/min/1.73 m^2 Final    Comment: Calculation used to obtain the estimated glomerular filtration  rate (eGFR) is the CKD-EPI equation.       Prothrombin Time 05/11/2021 10.9  9.0 - 12.5 sec Final    INR 05/11/2021 1.0  0.8 - 1.2 Final     Comment: Coumadin Therapy:  2.0 - 3.0 for INR for all indicators except mechanical heart valves  and antiphospholipid syndromes which should use 2.5 - 3.5.           Imaging:  I personally reviewed imaging studies and outside records..    Impression:     Hepatosplenomegaly, retroperitoneal lymphadenopathy, and nodular hepatic contour with heterogeneous signal.  Findings are nonspecific.  Diffuse infiltrative process/granulomatous disease such as sarcoid may have this appearance.  Cirrhosis is also a consideration.                                    11:37  :  Assessment:       1. Elevated liver enzymes    2. Elevated liver function tests    3. Chronic liver disease    4. Liver fibrosis    5. Gastroesophageal reflux disease without esophagitis             Plan:       1) Chronic liver disease  -Granulomatous inflammation on biopsy in past  -Review the recent labs  - liver labs and fibro scan  -Imaging  -If liver enzymes are elevated need biopsy     GERD  Continue PPI      Elevated Liver enzymes  -Monitor      Rest Mx Follow with Primary care    Return to clinic as per lab results    I have sent communication to the referring physician and/or primary care provider.    A total of 30  minutes were spent on the day of this encounter preparing for, treating and managing this patient and over half of that time was spent on counseling and coordination of care.  We discussed in depth the nature of the patient's liver disease, the management plan in details. I have provided the patient with an opportunity to ask questions and have all questions answered to his satisfaction.       Ibrahima Lyman MD  Transplant Hepatologist and Gastroenterologist  Hepatology and Liver Transplant  [facility]  [facility]

## 2023-05-02 ENCOUNTER — TELEPHONE (OUTPATIENT)
Dept: HEPATOLOGY | Facility: CLINIC | Age: 48
End: 2023-05-02

## 2023-05-02 ENCOUNTER — OFFICE VISIT (OUTPATIENT)
Dept: HEPATOLOGY | Facility: CLINIC | Age: 48
End: 2023-05-02
Payer: COMMERCIAL

## 2023-05-02 DIAGNOSIS — K21.9 GASTROESOPHAGEAL REFLUX DISEASE WITHOUT ESOPHAGITIS: ICD-10-CM

## 2023-05-02 DIAGNOSIS — R74.8 ELEVATED LIVER ENZYMES: Primary | ICD-10-CM

## 2023-05-02 DIAGNOSIS — R79.89 ELEVATED LIVER FUNCTION TESTS: ICD-10-CM

## 2023-05-02 DIAGNOSIS — K74.00 LIVER FIBROSIS: ICD-10-CM

## 2023-05-02 DIAGNOSIS — K76.9 CHRONIC LIVER DISEASE: ICD-10-CM

## 2023-05-02 PROBLEM — K80.20 CALCULUS OF GALLBLADDER: Status: ACTIVE | Noted: 2018-09-05

## 2023-05-02 PROCEDURE — 99214 OFFICE O/P EST MOD 30 MIN: CPT | Mod: 95,,, | Performed by: INTERNAL MEDICINE

## 2023-05-02 PROCEDURE — 99214 PR OFFICE/OUTPT VISIT, EST, LEVL IV, 30-39 MIN: ICD-10-PCS | Mod: 95,,, | Performed by: INTERNAL MEDICINE

## 2023-05-02 RX ORDER — ATORVASTATIN CALCIUM 20 MG/1
20 TABLET, FILM COATED ORAL
COMMUNITY
Start: 2023-04-21 | End: 2023-11-22 | Stop reason: ALTCHOICE

## 2023-05-02 RX ORDER — PANTOPRAZOLE SODIUM 40 MG/1
40 TABLET, DELAYED RELEASE ORAL DAILY
Qty: 30 TABLET | Refills: 11 | Status: SHIPPED | OUTPATIENT
Start: 2023-05-02 | End: 2024-05-01

## 2023-05-02 RX ORDER — PANTOPRAZOLE SODIUM 40 MG/1
40 TABLET, DELAYED RELEASE ORAL 2 TIMES DAILY
Qty: 28 TABLET | Refills: 0 | Status: SHIPPED | OUTPATIENT
Start: 2023-05-02 | End: 2023-05-16

## 2023-05-02 RX ORDER — ERGOCALCIFEROL 1.25 MG/1
50000 CAPSULE ORAL
COMMUNITY
Start: 2023-04-13

## 2023-05-02 NOTE — TELEPHONE ENCOUNTER
----- Message from Katarzyna Raza sent at 5/2/2023  3:45 PM CDT -----  Contact: patient Melany [phone]  Patient called requesting a call back from Dr. Lyman or his nurse, regarding appts here at [facility]

## 2023-05-02 NOTE — TELEPHONE ENCOUNTER
Attempted to return call to patient x 2 attempts. Phone would ring several times then be answered with no one coming to the line. Unable to leave a message.

## 2023-10-27 ENCOUNTER — HOSPITAL ENCOUNTER (OUTPATIENT)
Dept: RADIOLOGY | Facility: HOSPITAL | Age: 48
Discharge: HOME | End: 2023-10-27
Attending: NURSE PRACTITIONER
Payer: COMMERCIAL

## 2023-10-27 DIAGNOSIS — M79.609 PAIN IN UNSPECIFIED LIMB: ICD-10-CM

## 2023-10-27 DIAGNOSIS — M79.609 PAIN IN UNSPECIFIED LIMB: Primary | ICD-10-CM

## 2023-10-27 PROCEDURE — 73080 X-RAY EXAM OF ELBOW: CPT | Mod: 26,RT,, | Performed by: RADIOLOGY

## 2023-10-27 PROCEDURE — 73080 XR ELBOW COMPLETE 3 VIEW RIGHT: ICD-10-PCS | Mod: 26,RT,, | Performed by: RADIOLOGY

## 2023-10-27 PROCEDURE — 73030 XR SHOULDER COMPLETE 2 OR MORE VIEWS RIGHT: ICD-10-PCS | Mod: 26,RT,, | Performed by: RADIOLOGY

## 2023-10-27 PROCEDURE — 73030 X-RAY EXAM OF SHOULDER: CPT | Mod: TC,FY,RT

## 2023-10-27 PROCEDURE — 73080 X-RAY EXAM OF ELBOW: CPT | Mod: TC,FY,RT

## 2023-10-27 PROCEDURE — 73030 X-RAY EXAM OF SHOULDER: CPT | Mod: 26,RT,, | Performed by: RADIOLOGY

## 2023-11-22 ENCOUNTER — HOSPITAL ENCOUNTER (EMERGENCY)
Facility: HOSPITAL | Age: 48
Discharge: HOME | End: 2023-11-22
Attending: STUDENT IN AN ORGANIZED HEALTH CARE EDUCATION/TRAINING PROGRAM
Payer: COMMERCIAL

## 2023-11-22 VITALS
OXYGEN SATURATION: 100 % | HEIGHT: 66 IN | WEIGHT: 189 LBS | DIASTOLIC BLOOD PRESSURE: 78 MMHG | SYSTOLIC BLOOD PRESSURE: 136 MMHG | TEMPERATURE: 98 F | BODY MASS INDEX: 30.37 KG/M2 | RESPIRATION RATE: 18 BRPM | HEART RATE: 86 BPM

## 2023-11-22 DIAGNOSIS — M79.605 BILATERAL LEG PAIN: ICD-10-CM

## 2023-11-22 DIAGNOSIS — M79.604 BILATERAL LEG PAIN: ICD-10-CM

## 2023-11-22 LAB
B-HCG UR QL: NEGATIVE
BASOPHILS # BLD AUTO: 0.07 K/UL (ref 0–0.2)
BASOPHILS NFR BLD: 0.8 % (ref 0–1.9)
CTP QC/QA: YES
DIFFERENTIAL METHOD: NORMAL
EOSINOPHIL # BLD AUTO: 0.3 K/UL (ref 0–0.5)
EOSINOPHIL NFR BLD: 3.1 % (ref 0–8)
ERYTHROCYTE [DISTWIDTH] IN BLOOD BY AUTOMATED COUNT: 12.9 % (ref 11.5–14.5)
HCT VFR BLD AUTO: 41.5 % (ref 37–48.5)
HGB BLD-MCNC: 13.9 G/DL (ref 12–16)
IMM GRANULOCYTES # BLD AUTO: 0.01 K/UL (ref 0–0.04)
IMM GRANULOCYTES NFR BLD AUTO: 0.1 % (ref 0–0.5)
LYMPHOCYTES # BLD AUTO: 2.8 K/UL (ref 1–4.8)
LYMPHOCYTES NFR BLD: 34.3 % (ref 18–48)
MCH RBC QN AUTO: 28.8 PG (ref 27–31)
MCHC RBC AUTO-ENTMCNC: 33.5 G/DL (ref 32–36)
MCV RBC AUTO: 86 FL (ref 82–98)
MONOCYTES # BLD AUTO: 0.6 K/UL (ref 0.3–1)
MONOCYTES NFR BLD: 7.4 % (ref 4–15)
NEUTROPHILS # BLD AUTO: 4.5 K/UL (ref 1.8–7.7)
NEUTROPHILS NFR BLD: 54.3 % (ref 38–73)
NRBC BLD-RTO: 0 /100 WBC
PLATELET # BLD AUTO: 276 K/UL (ref 150–450)
PMV BLD AUTO: 10.6 FL (ref 9.2–12.9)
RBC # BLD AUTO: 4.83 M/UL (ref 4–5.4)
WBC # BLD AUTO: 8.26 K/UL (ref 3.9–12.7)

## 2023-11-22 PROCEDURE — 99283 EMERGENCY DEPT VISIT LOW MDM: CPT

## 2023-11-22 PROCEDURE — 85025 COMPLETE CBC W/AUTO DIFF WBC: CPT | Performed by: NURSE PRACTITIONER

## 2023-11-22 PROCEDURE — 81025 URINE PREGNANCY TEST: CPT | Performed by: NURSE PRACTITIONER

## 2023-11-22 PROCEDURE — 25000003 PHARM REV CODE 250: Performed by: NURSE PRACTITIONER

## 2023-11-22 RX ORDER — NAPROXEN 500 MG/1
500 TABLET ORAL
Status: COMPLETED | OUTPATIENT
Start: 2023-11-22 | End: 2023-11-22

## 2023-11-22 RX ORDER — SULINDAC 150 MG/1
150 TABLET ORAL 2 TIMES DAILY
Qty: 10 TABLET | Refills: 0 | Status: SHIPPED | OUTPATIENT
Start: 2023-11-22 | End: 2023-11-27

## 2023-11-22 RX ADMIN — NAPROXEN 500 MG: 500 TABLET ORAL at 03:11

## 2023-11-22 NOTE — Clinical Note
"Melany LIND"Medardo Hackett was seen and treated in our emergency department on 11/22/2023.  She may return to work on 11/24/2023.       If you have any questions or concerns, please don't hesitate to call.      LUZMA Valencia LPN"

## 2023-11-22 NOTE — ED PROVIDER NOTES
"Encounter Date: 11/22/2023       History     Chief Complaint   Patient presents with    Back Pain     Bilateral calf, ankles and feet pain with intermittent numbness for the past 4 days     Chief complaint:  Bilateral lower extremity pain    History of present illness: Patient is a 47-year-old female who reports gradual onset of bilateral lower extremity pain mostly posterior.  She reports that she is been told that she has a "low pulse" and she was sent here by an urgent care center because they did not have the necessary imaging modalities available.  She reports not having taken any medications or treatments for this issue.  Current pain is 8 on a scale of 10.    The history is provided by the patient. No  was used.     Review of patient's allergies indicates:   Allergen Reactions    Nicholas Itching     Black Beans     Past Medical History:   Diagnosis Date    Allergy     seasonal    Fatty liver     Gastroesophageal reflux disease without esophagitis 5/2/2023    Headache     Hyperlipemia     Sickle cell trait     Thyroid disease      Past Surgical History:   Procedure Laterality Date    CHOLECYSTECTOMY      COLONOSCOPY      unsure, thinks may have had it     ESOPHAGOGASTRODUODENOSCOPY N/A 3/18/2021    Procedure: EGD (ESOPHAGOGASTRODUODENOSCOPY);  Surgeon: Chaim Vizcaino MD;  Location: [address]);  Service: Endoscopy;  Laterality: N/A;  cirrhosis, variceal screening- last labs 3/5/21- ERW  prep ins. emailed - COVID screening [facility]W 3/15/21- [facility]    LAPAROSCOPIC BIOPSY OF LIVER N/A 10/19/2018    Procedure: BIOPSY, LIVER, LAPAROSCOPIC;  Surgeon: Lucien Mayes MD;  Location: [facility] OR;  Service: General;  Laterality: N/A;  RN PREOP 10/12/2018-----NEED H/P    LAPAROSCOPIC CHOLECYSTECTOMY  10/19/2018    Procedure: CHOLECYSTECTOMY, LAPAROSCOPIC;  Surgeon: Lucien Mayes MD;  Location: [facility] OR;  Service: General;;    TUBAL LIGATION      UTERINE FIBROID SURGERY       Family History "   Problem Relation Age of Onset    Hypertension Mother     Hyperlipidemia Mother     Heart disease Father     Kidney disease Father     Heart disease Sister     Breast cancer Maternal Aunt     Hypertension Maternal Aunt     Clotting disorder Maternal Aunt     Anxiety disorder Maternal Aunt     Heart attack Maternal Aunt     Diabetes Sister     Hyperlipidemia Sister     Hypertension Sister      Social History     Tobacco Use    Smoking status: Every Day     Current packs/day: 0.25     Average packs/day: 0.3 packs/day for 24.0 years (6.0 ttl pk-yrs)     Types: Cigarettes    Smokeless tobacco: Never   Substance Use Topics    Alcohol use: Not Currently     Comment: drank more in her 20s , none sense    Drug use: No     Review of Systems   Constitutional:  Negative for chills, fatigue and fever.   HENT:  Negative for congestion, ear discharge, ear pain, postnasal drip, rhinorrhea, sinus pressure, sneezing, sore throat and voice change.    Eyes:  Negative for discharge and itching.   Respiratory:  Negative for cough, shortness of breath and wheezing.    Cardiovascular:  Negative for chest pain, palpitations and leg swelling.   Gastrointestinal:  Negative for abdominal pain, constipation, diarrhea, nausea and vomiting.   Endocrine: Negative for polydipsia, polyphagia and polyuria.   Genitourinary:  Negative for dysuria, frequency, hematuria, urgency, vaginal bleeding, vaginal discharge and vaginal pain.   Musculoskeletal:  Positive for myalgias. Negative for arthralgias.   Skin:  Negative for rash and wound.   Neurological:  Negative for dizziness, seizures, syncope, weakness and numbness.   Hematological:  Negative for adenopathy. Does not bruise/bleed easily.   Psychiatric/Behavioral:  Negative for self-injury and suicidal ideas. The patient is not nervous/anxious.        Physical Exam     Initial Vitals [11/22/23 1335]   BP Pulse Resp Temp SpO2   (!) 151/84 92 18 98.1 °F (36.7 °C) 98 %      MAP       --         Physical  "Exam    Nursing note and vitals reviewed.  Constitutional: She appears well-developed and well-nourished.   HENT:   Head: Normocephalic and atraumatic.   Right Ear: External ear normal.   Left Ear: External ear normal.   Nose: Nose normal.   Eyes: Conjunctivae and EOM are normal. Pupils are equal, round, and reactive to light. Right eye exhibits no discharge. Left eye exhibits no discharge.   Neck:   Normal range of motion.  Abdominal: She exhibits no distension.   Musculoskeletal:         General: Normal range of motion.      Cervical back: Normal range of motion.     Neurological: She is alert and oriented to person, place, and time.   Skin: Skin is dry. Capillary refill takes less than 2 seconds.         ED Course   Procedures  Labs Reviewed   CBC W/ AUTO DIFFERENTIAL   POCT URINE PREGNANCY          Imaging Results    None          Medications   naproxen tablet 500 mg (500 mg Oral Given 11/22/23 6052)     Medical Decision Making  47-year-old female presents the emergency department complaining of bilateral leg pain worse on the right than on the left.  She reports she feels like her calves are swollen it has been told that she SA "low pulse" in both legs.  On physical examination there is no abnormalities of either leg.  She ambulates without antalgic gait.  DP and PT pulses bilaterally or dopplerable.  CBC does not indicate the presence of cellulitis.  Differential diagnoses include deep vein thrombosis cellulitis musculoskeletal pain arthritis    Problems Addressed:  Bilateral leg pain: acute illness or injury    Amount and/or Complexity of Data Reviewed  Labs: ordered. Decision-making details documented in ED Course.  Discussion of management or test interpretation with external provider(s): Clinoril prescribed as pain relief.  Patient discharged to follow-up with orthopedics.    Risk  Prescription drug management.  Diagnosis or treatment significantly limited by social determinants of health.  Risk Details: " "Vital signs at the time of disposition were:  /78 (BP Location: Left arm)   Pulse 86   Temp 98.1 °F (36.7 °C) (Oral)   Resp 18   Ht 5' 6" (1.676 m)   Wt 85.7 kg (189 lb)   LMP 03/18/2013   SpO2 100%   Breastfeeding No   BMI 30.51 kg/m²       See AVS for additional recommendations. Medications listed herein were prescribed after reviewing the patient's allergies, medication list, history, most recent laboratories as available.  Referrals below were provided after reviewing the patient's previous medical providers. She understands she  should return for any worsening or changes in condition.  Prior to discharge the patient was asked if she  had any additional concerns or complaints and she declined. The patient was given an opportunity to ask questions and all were answered to her satisfaction.       Additional MDM:     Well's Criteria Score:  -Clinical symptoms of DVT (leg swelling, pain with palpation) = 0.0  -PE is #1 diagnosis OR equally likely =            0.0  -Heart Rate >100 =   0.0  -Immobilization (= or > than 3 days) or surgery in the previous 4 weeks = 0.0  -Previous DVT/PE = 0.0  -Hemoptysis =          0.0  -Malignancy =           0.0  Well's Probability Score =    0              ED Course as of 11/22/23 2125 Wed Nov 22, 2023   1354 Preg Test, Ur: Negative [VC]   1354 BP(!): 151/84 [VC]   1354 Temp: 98.1 °F (36.7 °C) [VC]   1354 Temp Source: Oral [VC]   1354 Pulse: 92 [VC]   1354 SpO2: 98 % [VC]   1354 Resp: 18 [VC]   1532 CBC auto differential  Normal cbc. [VC]      ED Course User Index  [VC] Antwon Montana DNP                        Clinical Impression:  Final diagnoses:  [M79.604, M79.605] Bilateral leg pain          ED Disposition Condition    Discharge Stable          ED Prescriptions       Medication Sig Dispense Start Date End Date Auth. Provider    sulindac (CLINORIL) 150 MG tablet Take 1 tablet (150 mg total) by mouth 2 (two) times daily. for 5 days 10 tablet 11/22/2023 " 11/27/2023 Antwon Montana DNP          Follow-up Information       Follow up With Specialties Details Why Contact Info    Bhaskar Zuniga MD Orthopedic Surgery Schedule an appointment as soon as possible for a visit   [address] [facility] [id number]  [phone]               Antwon Montana DNP  11/22/23 2128

## 2024-02-22 ENCOUNTER — TELEPHONE (OUTPATIENT)
Dept: OBSTETRICS AND GYNECOLOGY | Facility: CLINIC | Age: 49
End: 2024-02-22
Payer: COMMERCIAL

## 2024-02-22 NOTE — TELEPHONE ENCOUNTER
"Pt is concerned because she had appt scheduled with Isaiah today for a "biopsy" but never received any information about abnormal PAP. Colpo has been scheduled but pt would like more information. [facility]  "

## 2024-02-23 NOTE — TELEPHONE ENCOUNTER
----- Message from Arabella Oshea sent at 2/23/2024  2:38 PM CST -----  Regarding: appt reschedule  Contact: [phone]  Pt is calling. She is being notified through [facility] my chart that she missed an appt yesterday. Pt stated she was there for her appt and was sent home by the staff. She stated she was in room 380 and was supposed to be called to reschedule this appt. Pt has not received a call to reschedule and does not want a no show to be marked for this appt due to her attending this appt. Please give this pt a call back soon she is very upset.

## 2024-02-23 NOTE — TELEPHONE ENCOUNTER
Handled by provider.    ----- Message from Arabella Oshea sent at 2/23/2024  2:42 PM CST -----  Regarding: abnormal pap  Contact: [phone]  Pt is calling. She stated she was not notified by Dr. Carrasco of her abnormal pap. Pt does not use [facility] my chart if this is how she Dr. Carrasco tried to notify her of this pap. Please have someone give her a call back soon.

## 2024-02-23 NOTE — TELEPHONE ENCOUNTER
Confirmed with the pt that she was not a NO SHOW but MyChart can't distinguish the difference. Pt is still requesting a call about PAP results. Confirmed I will send a message.

## 2024-03-12 ENCOUNTER — PROCEDURE VISIT (OUTPATIENT)
Dept: OBSTETRICS AND GYNECOLOGY | Facility: CLINIC | Age: 49
End: 2024-03-12
Payer: COMMERCIAL

## 2024-03-12 VITALS — DIASTOLIC BLOOD PRESSURE: 80 MMHG | SYSTOLIC BLOOD PRESSURE: 134 MMHG | WEIGHT: 183 LBS | BODY MASS INDEX: 29.53 KG/M2

## 2024-03-12 DIAGNOSIS — N90.89 VULVAR LESION: Primary | ICD-10-CM

## 2024-03-12 LAB
B-HCG UR QL: NEGATIVE
CTP QC/QA: YES

## 2024-03-12 PROCEDURE — 56605 BIOPSY OF VULVA/PERINEUM: CPT | Mod: S$GLB,,, | Performed by: OBSTETRICS & GYNECOLOGY

## 2024-03-12 PROCEDURE — 99499 UNLISTED E&M SERVICE: CPT | Mod: S$GLB,,, | Performed by: OBSTETRICS & GYNECOLOGY

## 2024-03-12 PROCEDURE — 88342 IMHCHEM/IMCYTCHM 1ST ANTB: CPT | Mod: 26,,, | Performed by: PATHOLOGY

## 2024-03-12 PROCEDURE — 81025 URINE PREGNANCY TEST: CPT | Mod: S$GLB,,, | Performed by: OBSTETRICS & GYNECOLOGY

## 2024-03-12 PROCEDURE — 88305 TISSUE EXAM BY PATHOLOGIST: CPT | Performed by: PATHOLOGY

## 2024-03-12 PROCEDURE — 88342 IMHCHEM/IMCYTCHM 1ST ANTB: CPT | Performed by: PATHOLOGY

## 2024-03-12 PROCEDURE — 88305 TISSUE EXAM BY PATHOLOGIST: CPT | Mod: 26,,, | Performed by: PATHOLOGY

## 2024-03-12 NOTE — PROCEDURES
Biopsy (Gynecological)    Date/Time: 3/12/2024 10:40 AM    Performed by: Shahzad Carrasco MD  Authorized by: Shahzad Carrasco MD    Consent obtained:  Prior to procedure the appropriate consent was completed and verified  Local anesthesia used?: Yes    Anesthesia:  Local infiltration  Local anesthetic:  Lidocaine 2% without epinephrine    Biopsy Location:  Vulva (3 mm punch)  Vulva:     # of lesions:  1  Estimated blood loss (cc):  2   Patient tolerated the procedure well with no immediate complications.     Indication:  hypopigmented area with persistent itching.    Bleeding abated with silver nitrate    Treatment based on results    Female chaperone, Preston Marie,  present for entire exam

## 2024-03-20 LAB
FINAL PATHOLOGIC DIAGNOSIS: NORMAL
GROSS: NORMAL
Lab: NORMAL

## 2024-04-05 ENCOUNTER — TELEPHONE (OUTPATIENT)
Dept: OBSTETRICS AND GYNECOLOGY | Facility: CLINIC | Age: 49
End: 2024-04-05
Payer: COMMERCIAL

## 2024-04-05 NOTE — TELEPHONE ENCOUNTER
Pt requesting to know what the diagnosis from biopsy. PCP at [facility] is requesting. Message sent to provider.    ----- Message from Rena Garcia sent at 4/5/2024  9:52 AM CDT -----  .Type: Patient Call Back    Who called: Self    What is the request in detail: Calling to get test results     Can the clinic reply by [facility]? No     Would the patient rather a call back or a response via [facility]? Call Back     Best call back number: .[phone] (home)       Additional Information:

## (undated) DEVICE — SOL NS 1000CC

## (undated) DEVICE — SYR 10CC LUER LOCK

## (undated) DEVICE — TROCAR ENDOPATH XCEL 11MM 10CM

## (undated) DEVICE — APPLICATOR CHLORAPREP ORN 26ML

## (undated) DEVICE — Device

## (undated) DEVICE — SUT 4/0 18IN COATED VICRYL

## (undated) DEVICE — TROCAR ENDOPATH XCEL 5X100MM

## (undated) DEVICE — HEMOSTAT SURGICEL 4X8IN

## (undated) DEVICE — DRESSING ADH ISLAND 2.5 X 3

## (undated) DEVICE — ELECTRODE REM PLYHSV RETURN 9

## (undated) DEVICE — SEE MEDLINE ITEM 152622

## (undated) DEVICE — CLOSURE SKIN STERI STRIP 1/2X4

## (undated) DEVICE — CANISTER SUCTION 2 LTR

## (undated) DEVICE — COVER OVERHEAD SURG LT BLUE

## (undated) DEVICE — SCISSOR CURVED ENDOPATH 5MM

## (undated) DEVICE — PACK ENDOSCOPY GENERAL

## (undated) DEVICE — APPLIER CLIP ENDO LIGAMAX 5MM

## (undated) DEVICE — BLANKET UPPER BODY 78.7X29.9IN

## (undated) DEVICE — SHEARS HARMONIC 36CM HD 1000I

## (undated) DEVICE — NDL HYPO REG 25G X 1 1/2

## (undated) DEVICE — BLADE SURG CARBON STEEL SZ11

## (undated) DEVICE — GLOVE BIOGEL 7.5

## (undated) DEVICE — SUPPORT ULNA NERVE PROTECTOR

## (undated) DEVICE — TUBING INSUFFLATION 10

## (undated) DEVICE — KIT ANTIFOG